# Patient Record
Sex: MALE | Race: WHITE | Employment: FULL TIME | URBAN - METROPOLITAN AREA
[De-identification: names, ages, dates, MRNs, and addresses within clinical notes are randomized per-mention and may not be internally consistent; named-entity substitution may affect disease eponyms.]

---

## 2019-02-23 ENCOUNTER — HOSPITAL ENCOUNTER (EMERGENCY)
Age: 29
Discharge: HOME | End: 2019-02-23
Attending: EMERGENCY MEDICINE
Payer: COMMERCIAL

## 2019-02-23 ENCOUNTER — APPOINTMENT (OUTPATIENT)
Dept: GENERAL RADIOLOGY | Age: 29
End: 2019-02-23
Attending: EMERGENCY MEDICINE
Payer: COMMERCIAL

## 2019-02-23 VITALS
TEMPERATURE: 98.4 F | HEIGHT: 71 IN | BODY MASS INDEX: 29.48 KG/M2 | RESPIRATION RATE: 18 BRPM | WEIGHT: 210.54 LBS | SYSTOLIC BLOOD PRESSURE: 170 MMHG | HEART RATE: 90 BPM | DIASTOLIC BLOOD PRESSURE: 104 MMHG | OXYGEN SATURATION: 100 %

## 2019-02-23 DIAGNOSIS — J98.01 ACUTE BRONCHOSPASM: ICD-10-CM

## 2019-02-23 DIAGNOSIS — J98.2 PNEUMOMEDIASTINUM (HCC): Primary | ICD-10-CM

## 2019-02-23 DIAGNOSIS — Z71.6 ENCOUNTER FOR SMOKING CESSATION COUNSELING: ICD-10-CM

## 2019-02-23 DIAGNOSIS — R09.89 CHOKING EPISODE: ICD-10-CM

## 2019-02-23 PROCEDURE — 74011250637 HC RX REV CODE- 250/637: Performed by: EMERGENCY MEDICINE

## 2019-02-23 PROCEDURE — 74011000250 HC RX REV CODE- 250: Performed by: EMERGENCY MEDICINE

## 2019-02-23 PROCEDURE — 99284 EMERGENCY DEPT VISIT MOD MDM: CPT

## 2019-02-23 PROCEDURE — 71046 X-RAY EXAM CHEST 2 VIEWS: CPT

## 2019-02-23 RX ORDER — FAMOTIDINE 20 MG/1
20 TABLET, FILM COATED ORAL
Status: COMPLETED | OUTPATIENT
Start: 2019-02-23 | End: 2019-02-23

## 2019-02-23 RX ORDER — PREDNISONE 10 MG/1
TABLET ORAL
Qty: 21 TAB | Refills: 0 | Status: ON HOLD | OUTPATIENT
Start: 2019-02-23 | End: 2020-12-15

## 2019-02-23 RX ORDER — ONDANSETRON 4 MG/1
4 TABLET, ORALLY DISINTEGRATING ORAL
Qty: 20 TAB | Refills: 0 | Status: ON HOLD | OUTPATIENT
Start: 2019-02-23 | End: 2020-12-15

## 2019-02-23 RX ORDER — DEXAMETHASONE SODIUM PHOSPHATE 4 MG/ML
10 INJECTION, SOLUTION INTRA-ARTICULAR; INTRALESIONAL; INTRAMUSCULAR; INTRAVENOUS; SOFT TISSUE
Status: COMPLETED | OUTPATIENT
Start: 2019-02-23 | End: 2019-02-23

## 2019-02-23 RX ORDER — BENZONATATE 100 MG/1
100 CAPSULE ORAL
Qty: 30 CAP | Refills: 0 | Status: SHIPPED | OUTPATIENT
Start: 2019-02-23 | End: 2019-02-23

## 2019-02-23 RX ORDER — DEXTROAMPHETAMINE SACCHARATE, AMPHETAMINE ASPARTATE, DEXTROAMPHETAMINE SULFATE AND AMPHETAMINE SULFATE 7.5; 7.5; 7.5; 7.5 MG/1; MG/1; MG/1; MG/1
30 TABLET ORAL DAILY
Status: ON HOLD | COMMUNITY
End: 2020-12-15

## 2019-02-23 RX ORDER — PREDNISONE 10 MG/1
TABLET ORAL
Qty: 21 TAB | Refills: 0 | Status: SHIPPED | OUTPATIENT
Start: 2019-02-23 | End: 2019-02-23

## 2019-02-23 RX ORDER — KETOROLAC TROMETHAMINE 10 MG/1
10 TABLET, FILM COATED ORAL
Qty: 20 TAB | Refills: 0 | Status: SHIPPED | OUTPATIENT
Start: 2019-02-23 | End: 2019-02-23

## 2019-02-23 RX ORDER — KETOROLAC TROMETHAMINE 10 MG/1
10 TABLET, FILM COATED ORAL
Qty: 20 TAB | Refills: 0 | Status: ON HOLD | OUTPATIENT
Start: 2019-02-23 | End: 2020-12-15

## 2019-02-23 RX ORDER — BENZONATATE 100 MG/1
100 CAPSULE ORAL
Qty: 30 CAP | Refills: 0 | Status: SHIPPED | OUTPATIENT
Start: 2019-02-23 | End: 2019-03-02

## 2019-02-23 RX ORDER — ONDANSETRON 4 MG/1
4 TABLET, ORALLY DISINTEGRATING ORAL
Qty: 20 TAB | Refills: 0 | Status: SHIPPED | OUTPATIENT
Start: 2019-02-23 | End: 2019-02-23

## 2019-02-23 RX ADMIN — DEXAMETHASONE SODIUM PHOSPHATE 10 MG: 4 INJECTION, SOLUTION INTRA-ARTICULAR; INTRALESIONAL; INTRAMUSCULAR; INTRAVENOUS; SOFT TISSUE at 03:05

## 2019-02-23 RX ADMIN — LIDOCAINE HYDROCHLORIDE 40 ML: 20 SOLUTION ORAL; TOPICAL at 03:06

## 2019-02-23 RX ADMIN — FAMOTIDINE 20 MG: 20 TABLET ORAL at 03:05

## 2019-02-23 NOTE — ED PROVIDER NOTES
EMERGENCY DEPARTMENT HISTORY AND PHYSICAL EXAM 
 
 
Date: 2/23/2019 Patient Name: Ghada Blevins History of Presenting Illness Chief Complaint Patient presents with  Hemoptysis  
  ambulatory to triage c/o eating pot roast at 2330 & feels like its stuck in throat. Denies gi MD or meds pta. Speaking in full sentences in triage. Nothing observed in throat. Reports coughing up blood pta. History Provided By: Patient HPI: Ghada Blevins, 33 y.o. male with no significant PMHx significant, presents ambulatory to the ED with cc of acute onset tightness in throat aftter eating pot roast around 2330 last night. Pt reports that his voice is a lot higher than usual. He also adds that he coughed up some bright red blood. Additionally, pt specifically denies any recent fever, chills, headache, nausea, vomiting, diarrhea, abdominal pain, CP, lightheadedness, dizziness, numbness, weakness, tingling, BLE swelling, heart palpitations, urinary sxs, changes in BM, melena, hematochezia, or congestion. PCP: None PMHx: Significant for none PSHx: Significant for none Social Hx: E-cigarette, -EtOH, -Illicit Drugs There are no other complaints, changes or physical findings at this time. Past History Past Medical History: 
History reviewed. No pertinent past medical history. Past Surgical History: 
History reviewed. No pertinent surgical history. Family History: 
History reviewed. No pertinent family history. Social History: 
Social History Tobacco Use  Smoking status: Current Every Day Smoker  Smokeless tobacco: Never Used  Tobacco comment: E-cig Substance Use Topics  Alcohol use: Not on file  Drug use: No  
 
 
Allergies: 
No Known Allergies Medications: No current facility-administered medications on file prior to encounter. Current Outpatient Medications on File Prior to Encounter Medication Sig Dispense Refill  dextroamphetamine-amphetamine (ADDERALL) 30 mg tablet Take 30 mg by mouth daily. Review of Systems Review of Systems Constitutional: Negative. Negative for chills and fever. HENT: Positive for sore throat and trouble swallowing. Negative for congestion, facial swelling, rhinorrhea and voice change. Eyes: Negative. Respiratory: Positive for cough (blood) and shortness of breath. Negative for apnea, chest tightness and wheezing. Cardiovascular: Negative. Negative for chest pain, palpitations and leg swelling. Gastrointestinal: Negative. Negative for abdominal distention, abdominal pain, blood in stool, constipation, diarrhea, nausea and vomiting. Endocrine: Negative. Negative for cold intolerance, heat intolerance and polyuria. Genitourinary: Negative. Negative for difficulty urinating, dysuria, flank pain, frequency, hematuria and urgency. Musculoskeletal: Negative. Negative for arthralgias, back pain, myalgias, neck pain and neck stiffness. Skin: Negative. Negative for color change and rash. Neurological: Negative. Negative for dizziness, syncope, facial asymmetry, speech difficulty, weakness, light-headedness, numbness and headaches. Hematological: Negative. Does not bruise/bleed easily. Psychiatric/Behavioral: Negative. Negative for confusion and self-injury. The patient is not nervous/anxious. Physical Exam  
Physical Exam  
Constitutional: He is oriented to person, place, and time. Vital signs are normal. He appears well-developed and well-nourished. He is cooperative. Non-toxic appearance. HENT:  
Head: Normocephalic and atraumatic. Mouth/Throat: Mucous membranes are normal. No posterior oropharyngeal erythema. Eyes: Conjunctivae and EOM are normal. Pupils are equal, round, and reactive to light. Neck: Normal range of motion.   
Cardiovascular: Normal rate, regular rhythm, normal heart sounds and intact distal pulses. Exam reveals no gallop and no friction rub. No murmur heard. Pulmonary/Chest: Effort normal and breath sounds normal. No respiratory distress. He has no wheezes. He has no rales. He exhibits no tenderness. Abdominal: Soft. Bowel sounds are normal. He exhibits no distension and no mass. There is no tenderness. There is no rebound and no guarding. Musculoskeletal: Normal range of motion. He exhibits no edema, tenderness or deformity. Neurological: He is alert and oriented to person, place, and time. He displays normal reflexes. No cranial nerve deficit. He exhibits normal muscle tone. Coordination normal.  
Skin: Skin is warm. No rash noted. Psychiatric: He has a normal mood and affect. Nursing note and vitals reviewed. Diagnostic Study Results Radiologic Studies -  
XR CHEST PA LAT Final Result IMPRESSION: Pneumomediastinum extending into the neck. No pneumothorax or  
pleural effusion is evident. CXR Results  (Last 48 hours) 02/23/19 0230  XR CHEST PA LAT Final result Impression:  IMPRESSION: Pneumomediastinum extending into the neck. No pneumothorax or  
pleural effusion is evident. Narrative:  INDICATION: Hemoptysis FINDINGS: PA and lateral views of the chest demonstrate a normal heart size. There is evidence of pneumomediastinum extending into the neck. The lungs are  
clear. No pneumothorax or pleural effusion is evident. The visualized osseous  
structures are unremarkable. Medical Decision Making I am the first provider for this patient. I reviewed the vital signs, available nursing notes, past medical history, past surgical history, family history and social history. Vital Signs-Reviewed the patient's vital signs. Patient Vitals for the past 24 hrs: 
 Temp Pulse Resp BP SpO2  
02/23/19 0150     100 % 02/23/19 0137 98.4 °F (36.9 °C) 90 18 (!) 170/104 100 % Pulse Oximetry Analysis - 100% on RA Cardiac Monitor:  
Rate: 90 bpm 
Rhythm: Normal Sinus Rhythm Records Reviewed: Nursing Notes, Old Medical Records, Previous electrocardiograms, Previous Radiology Studies and Previous Laboratory Studies Provider Notes (Medical Decision Making):  
Patient presents with acute dyspnea likely bronchospasm from coughing episode. DDx: asthma, copd, pna, pulmonary edema, acute bronchitis, ptx, pna. Will obtain CXR, provide O2 as needed for hypoxia, treat symptomatically and reassess. Will continue to monitor closely in ED. ED Course:  
Initial assessment performed. The patients presenting problems have been discussed, and they are in agreement with the care plan formulated and outlined with them. I have encouraged them to ask questions as they arise throughout their visit. TOBACCO COUNSELING:  
Upon evaluation, pt expressed that they are a current tobacco user. For approximately 10 minutes, pt has been counseled on the dangers of smoking and was encouraged to quit as soon as possible in order to decrease further risks to their health. Pt has conveyed their understanding of the risks involved should they continue to use tobacco products. I reviewed our electronic medical record system for any past medical records that were available that may contribute to the patient's current condition, the nursing notes and vital signs from today's visit. Adry Gerardo MD 
Medications Administered During ED Course: 
Medications  
mylanta/viscous lidocaine (GI COCKTAIL) (40 mL Oral Given 2/23/19 0306) dexamethasone (DECADRON) 4 mg/mL injection 10 mg (10 mg Oral Given 2/23/19 0305) famotidine (PEPCID) tablet 20 mg (20 mg Oral Given 2/23/19 0305) Progress Note: 
2:45 PM 
CXR reviewed; evidence of uncomplicated spontaneous pneumomediastinum; vitals stable; no hypoxia, no resp distress.  Will manage conservatively with analgesia, rest, and avoidance of maneuvers that increase pulmonary pressure (Valsalva). Progress Note: 
2:56 AM 
Patient has been reassessed and reports feeling better and symptoms have improved after ED treatment. Bekah Chen is able to tolerate PO and ambulate per baseline. Jenae Anderson imaging has been reviewed with him. He has been counseled regarding his diagnosis. He verbally conveys understanding and agreement of the signs, symptoms, diagnosis, treatment and prognosis and additionally agrees to follow up as recommended with Dr. None in 24 - 48 hours. He also agrees with the care-plan and conveys that all of his questions have been answered. I have also put together some discharge instructions for him that include: 1) educational information regarding their diagnosis, 2) how to care for their diagnosis at home, as well a 3) list of reasons why they would want to return to the ED prior to their follow-up appointment, should their condition change. I have answered all questions to patient's satisfaction. He both understood and agreed with plan as discussed above. Vital signs stable for discharge. Critical Care Time:  
None Disposition: 
Discharge Note: 
3:20 AM 
The patient has been re-evaluated and is ready for discharge. Reviewed available results with patient. Counseled patient/parent/guardian on diagnosis and care plan. Patient has expressed understanding, and all questions have been answered. Patient agrees with plan and agrees to follow up as recommended, or return to the ED if their symptoms worsen. Discharge instructions have been provided and explained to the patient, along with reasons to return to the ED. PLAN: 
1. Discharge Discharge Medication List as of 2/23/2019  3:26 AM  
  
CONTINUE these medications which have NOT CHANGED Details  
dextroamphetamine-amphetamine (ADDERALL) 30 mg tablet Take 30 mg by mouth daily. , Historical Med  
  
  
 No current facility-administered medications for this encounter. Current Outpatient Medications:  
  dextroamphetamine-amphetamine (ADDERALL) 30 mg tablet, Take 30 mg by mouth daily. , Disp: , Rfl:  
  benzonatate (TESSALON PERLES) 100 mg capsule, Take 1 Cap by mouth three (3) times daily as needed for Cough for up to 7 days. , Disp: 30 Cap, Rfl: 0 
  predniSONE (STERAPRED DS) 10 mg dose pack, Take as prescribed, Disp: 21 Tab, Rfl: 0 
  ondansetron (ZOFRAN ODT) 4 mg disintegrating tablet, Take 1 Tab by mouth every eight (8) hours as needed for Nausea., Disp: 20 Tab, Rfl: 0 
  ketorolac (TORADOL) 10 mg tablet, Take 1 Tab by mouth every six (6) hours as needed for Pain., Disp: 20 Tab, Rfl: 0 
 
2. Follow-up Information Follow up With Specialties Details Why Contact Info None    None (395) Patient stated that they have no PCP [facility] EM[facility] Emergency Medicine  As needed, If symptoms worsen [address] [place] [address] [address] [facility] 
[phone] Return to ED if worse Diagnosis Clinical Impression: 1. Pneumomediastinum ([address] 2. Acute bronchospasm 3. Choking episode 4. Encounter for smoking cessation counseling Attestations This note is prepared by Carole Lo, acting as Scribe for MD Lou Ramires MD : The scribe's documentation has been prepared under my direction and personally reviewed by me in its entirety. I confirm that the note above accurately reflects all work, treatment, procedures, and medical decision making performed by me. 
 
: 
This note will not be viewable in [address] Yahir Rodriguez

## 2019-02-23 NOTE — LETTER
[facility] EMERGENCY DEPT 
[address] [place] [address] [phone] Work/School Note Date: 2/23/2019 To Whom It May concern: 
 
Quinton Medina was seen and treated today in the emergency room by the following provider(s): 
Attending Provider: Fredy Wing MD. Quinton Medina may return to work on 2/25/19. Sincerely, Millie Benz MD

## 2019-02-23 NOTE — DISCHARGE INSTRUCTIONS
Thank you for allowing us to take care of you today! We hope we addressed all of your concerns and needs. We strive to provide excellent quality care in the Emergency Department. You will receive a survey after your visit to evaluate the care you were provided. Should you receive a survey from us, we invite you to share your experience and tell us what made it excellent. It was a pleasure serving you, we invite you to share your experience with us, in our pursuit for excellence, should you be selected to receive a survey. The exam and treatment you received in the Emergency Department were for an urgent problem and are not intended as complete care. It is important that you follow up with a doctor, nurse practitioner, or physician assistant for ongoing care. If your symptoms become worse or you do not improve as expected and you are unable to reach your usual health care provider, you should return to the Emergency Department. We are available 24 hours a day. Please take your discharge instructions with you when you go to your follow-up appointment. If you have any problem arranging a follow-up appointment, contact the Emergency Department immediately. If a prescription has been provided, please have it filled as soon as possible to prevent a delay in treatment. Read the entire medication instruction sheet provided to you by the pharmacy. If you have any questions or reservations about taking the medication due to side effects or interactions with other medications, please call your primary care physician or contact the ER to speak with the charge nurse. Make an appointment with your family doctor or the physician you were referred to for follow-up of this visit as instructed on your discharge paperwork, as this is mandatory follow-up. Return to the ER if you are unable to be seen or if you are unable to be seen in a timely manner.     If you have any problem arranging the follow-up visit, contact the Emergency Department immediately. I hope you feel better and thank you again for allow us to provide you with excellent care today at [facility]! Warmest regards,    Millie Benz MD  Emergency Medicine Physician  [facility]              Patient Education        Pneumomediastinum: Care Instructions  Your Care Instructions  Pneumomediastinum (say \"[id number]\") is the leakage of air into a space between the lungs (mediastinum). As air builds up in this space in your chest, you may have shortness of breath or chest pain that moves to your neck or arm. Most cases get better on their own. But if there are problems, you may need to breathe oxygen through a face mask, or have a tube placed in your chest, to help you heal. It can take several days for the leaked air to be reabsorbed by your body, and you may need more treatment. The doctor has checked you carefully, but problems can develop later. If you notice any problems or new symptoms, get medical treatment right away. Follow-up care is a key part of your treatment and safety. Be sure to make and go to all appointments, and call your doctor if you are having problems. It's also a good idea to know your test results and keep a list of the medicines you take. How can you care for yourself at home? · Get plenty of rest and sleep. You may feel weak and tired for a while, but your energy level will improve with time. · To relieve chest pain, especially when you cough, press a pillow against your chest.  · Take pain medicines exactly as directed:  ? If the doctor gave you a prescription medicine for pain, take it as prescribed. ? If you are not taking a prescription pain medicine, ask your doctor if you can take an over-the-counter pain medicine. · If your doctor prescribed antibiotics, take them as directed.  Do not stop taking them just because you feel better. You need to take the full course of antibiotics. · If you go home with a chest tube in place, follow your doctor's directions. ? Do not adjust the tube in any way. This could break the seal or cause other problems. ? Keep the tube dry. · If you have a bandage over where a chest tube was inserted, keep it clean and dry. Follow your doctor's instructions on bandage care. · Avoid any movements that make you strain your muscles. Try not to cough, laugh hard, or lift anything heavy. · Do not smoke or allow others to smoke around you. If you need help quitting, talk to your doctor about stop-smoking programs and medicines. These can increase your chances of quitting for good. When should you call for help? Call 911 anytime you think your may need emergency care. For example, call if:    · You have severe trouble breathing.     · You passed out (lost consciousness).     · You have severe chest pain.    Call your doctor now or seek immediate medical care if:    · You have new or worse trouble breathing.     · You have new or worse chest pain.     · You have a fever.     · You cough up blood.     · You are bleeding through the bandage where the tube was put in.    Watch closely for changes in your health, and be sure to contact your doctor if:    · You do not get better as expected. Where can you learn more? Go to http://kat-franklin.info/. Enter B290 in the search box to learn more about \"Pneumomediastinum: Care Instructions. \"  Current as of: September 5, 2018  Content Version: 11.9  © 3483-3078 [employer]. Care instructions adapted under license by [employer] (which disclaims liability or warranty for this information). If you have questions about a medical condition or this instruction, always ask your healthcare professional. [address] any warranty or liability for your use of this information.

## 2019-02-23 NOTE — ED NOTES
MD Urban Mina reviewed discharge instructions with the patient. The patient verbalized understanding. All questions and concerns were addressed. The patient declined a wheelchair and is discharged ambulatory  with instructions and prescriptions in hand. Pt is alert and oriented x 4. Respirations are clear and unlabored.

## 2020-12-14 ENCOUNTER — HOSPITAL ENCOUNTER (INPATIENT)
Age: 30
LOS: 3 days | Discharge: HOME | DRG: 885 | End: 2020-12-17
Attending: EMERGENCY MEDICINE | Admitting: PSYCHIATRY & NEUROLOGY
Payer: COMMERCIAL

## 2020-12-14 DIAGNOSIS — F41.1 ANXIETY STATE: Primary | ICD-10-CM

## 2020-12-14 PROBLEM — F31.2 BIPOLAR AFFECTIVE DISORDER, CURRENT EPISODE MANIC WITH PSYCHOTIC SYMPTOMS (HCC): Status: ACTIVE | Noted: 2020-12-14

## 2020-12-14 LAB
ALBUMIN SERPL-MCNC: 4.8 G/DL (ref 3.5–5)
ALBUMIN/GLOB SERPL: 1.4 {RATIO} (ref 1.1–2.2)
ALP SERPL-CCNC: 99 U/L (ref 45–117)
ALT SERPL-CCNC: 39 U/L (ref 12–78)
AMPHET UR QL SCN: NEGATIVE
ANION GAP SERPL CALC-SCNC: 6 MMOL/L (ref 5–15)
APAP SERPL-MCNC: <2 UG/ML (ref 10–30)
APPEARANCE UR: CLEAR
AST SERPL-CCNC: 27 U/L (ref 15–37)
BACTERIA URNS QL MICRO: NEGATIVE /HPF
BARBITURATES UR QL SCN: NEGATIVE
BASOPHILS # BLD: 0.1 K/UL (ref 0–0.1)
BASOPHILS NFR BLD: 1 % (ref 0–1)
BENZODIAZ UR QL: NEGATIVE
BILIRUB SERPL-MCNC: 0.5 MG/DL (ref 0.2–1)
BILIRUB UR QL: NEGATIVE
BUN SERPL-MCNC: 11 MG/DL (ref 6–20)
BUN/CREAT SERPL: 10 (ref 12–20)
CALCIUM SERPL-MCNC: 9.7 MG/DL (ref 8.5–10.1)
CANNABINOIDS UR QL SCN: POSITIVE
CHLORIDE SERPL-SCNC: 107 MMOL/L (ref 97–108)
CO2 SERPL-SCNC: 25 MMOL/L (ref 21–32)
COCAINE UR QL SCN: NEGATIVE
COLOR UR: ABNORMAL
COVID-19 RAPID TEST, COVR: NOT DETECTED
CREAT SERPL-MCNC: 1.11 MG/DL (ref 0.7–1.3)
DIFFERENTIAL METHOD BLD: ABNORMAL
DRUG SCRN COMMENT,DRGCM: ABNORMAL
EOSINOPHIL # BLD: 0 K/UL (ref 0–0.4)
EOSINOPHIL NFR BLD: 0 % (ref 0–7)
EPITH CASTS URNS QL MICRO: ABNORMAL /LPF
ERYTHROCYTE [DISTWIDTH] IN BLOOD BY AUTOMATED COUNT: 13 % (ref 11.5–14.5)
ETHANOL SERPL-MCNC: <10 MG/DL
GLOBULIN SER CALC-MCNC: 3.5 G/DL (ref 2–4)
GLUCOSE SERPL-MCNC: 98 MG/DL (ref 65–100)
GLUCOSE UR STRIP.AUTO-MCNC: NEGATIVE MG/DL
HCT VFR BLD AUTO: 47.9 % (ref 36.6–50.3)
HGB BLD-MCNC: 16.3 G/DL (ref 12.1–17)
HGB UR QL STRIP: ABNORMAL
HYALINE CASTS URNS QL MICRO: ABNORMAL /LPF (ref 0–5)
IMM GRANULOCYTES # BLD AUTO: 0.1 K/UL (ref 0–0.04)
IMM GRANULOCYTES NFR BLD AUTO: 1 % (ref 0–0.5)
KETONES UR QL STRIP.AUTO: ABNORMAL MG/DL
LEUKOCYTE ESTERASE UR QL STRIP.AUTO: NEGATIVE
LYMPHOCYTES # BLD: 1.9 K/UL (ref 0.8–3.5)
LYMPHOCYTES NFR BLD: 20 % (ref 12–49)
MCH RBC QN AUTO: 30.9 PG (ref 26–34)
MCHC RBC AUTO-ENTMCNC: 34 G/DL (ref 30–36.5)
MCV RBC AUTO: 90.9 FL (ref 80–99)
METHADONE UR QL: NEGATIVE
MONOCYTES # BLD: 0.8 K/UL (ref 0–1)
MONOCYTES NFR BLD: 8 % (ref 5–13)
NEUTS SEG # BLD: 7 K/UL (ref 1.8–8)
NEUTS SEG NFR BLD: 70 % (ref 32–75)
NITRITE UR QL STRIP.AUTO: NEGATIVE
NRBC # BLD: 0 K/UL (ref 0–0.01)
NRBC BLD-RTO: 0 PER 100 WBC
OPIATES UR QL: NEGATIVE
PCP UR QL: NEGATIVE
PH UR STRIP: 5.5 [PH] (ref 5–8)
PLATELET # BLD AUTO: 304 K/UL (ref 150–400)
PMV BLD AUTO: 9.2 FL (ref 8.9–12.9)
POTASSIUM SERPL-SCNC: 4 MMOL/L (ref 3.5–5.1)
PROT SERPL-MCNC: 8.3 G/DL (ref 6.4–8.2)
PROT UR STRIP-MCNC: NEGATIVE MG/DL
RBC # BLD AUTO: 5.27 M/UL (ref 4.1–5.7)
RBC #/AREA URNS HPF: ABNORMAL /HPF (ref 0–5)
SALICYLATES SERPL-MCNC: <1.7 MG/DL (ref 2.8–20)
SARS-COV-2, COV2: NOT DETECTED
SODIUM SERPL-SCNC: 138 MMOL/L (ref 136–145)
SOURCE, COVRS: NORMAL
SP GR UR REFRACTOMETRY: 1.02 (ref 1–1.03)
SPECIMEN SOURCE, FCOV2M: NORMAL
SPECIMEN SOURCE, FCOV2M: NORMAL
SPECIMEN TYPE, XMCV1T: NORMAL
UR CULT HOLD, URHOLD: NORMAL
UROBILINOGEN UR QL STRIP.AUTO: 0.2 EU/DL (ref 0.2–1)
WBC # BLD AUTO: 9.9 K/UL (ref 4.1–11.1)
WBC URNS QL MICRO: ABNORMAL /HPF (ref 0–4)

## 2020-12-14 PROCEDURE — 81001 URINALYSIS AUTO W/SCOPE: CPT

## 2020-12-14 PROCEDURE — 65220000003 HC RM SEMIPRIVATE PSYCH

## 2020-12-14 PROCEDURE — 36415 COLL VENOUS BLD VENIPUNCTURE: CPT

## 2020-12-14 PROCEDURE — 87635 SARS-COV-2 COVID-19 AMP PRB: CPT

## 2020-12-14 PROCEDURE — 80307 DRUG TEST PRSMV CHEM ANLYZR: CPT

## 2020-12-14 PROCEDURE — 74011250637 HC RX REV CODE- 250/637: Performed by: NURSE PRACTITIONER

## 2020-12-14 PROCEDURE — 85025 COMPLETE CBC W/AUTO DIFF WBC: CPT

## 2020-12-14 PROCEDURE — 84443 ASSAY THYROID STIM HORMONE: CPT

## 2020-12-14 PROCEDURE — 80053 COMPREHEN METABOLIC PANEL: CPT

## 2020-12-14 PROCEDURE — 74011250637 HC RX REV CODE- 250/637: Performed by: STUDENT IN AN ORGANIZED HEALTH CARE EDUCATION/TRAINING PROGRAM

## 2020-12-14 PROCEDURE — 99284 EMERGENCY DEPT VISIT MOD MDM: CPT

## 2020-12-14 PROCEDURE — 90791 PSYCH DIAGNOSTIC EVALUATION: CPT

## 2020-12-14 RX ORDER — OLANZAPINE 5 MG/1
5 TABLET ORAL
Status: DISCONTINUED | OUTPATIENT
Start: 2020-12-14 | End: 2020-12-16

## 2020-12-14 RX ORDER — CLONIDINE HYDROCHLORIDE 0.1 MG/1
0.2 TABLET ORAL
Status: COMPLETED | OUTPATIENT
Start: 2020-12-14 | End: 2020-12-14

## 2020-12-14 RX ORDER — DIPHENHYDRAMINE HYDROCHLORIDE 50 MG/ML
50 INJECTION, SOLUTION INTRAMUSCULAR; INTRAVENOUS
Status: DISCONTINUED | OUTPATIENT
Start: 2020-12-14 | End: 2020-12-17 | Stop reason: HOSPADM

## 2020-12-14 RX ORDER — ADHESIVE BANDAGE
30 BANDAGE TOPICAL DAILY PRN
Status: DISCONTINUED | OUTPATIENT
Start: 2020-12-14 | End: 2020-12-17 | Stop reason: HOSPADM

## 2020-12-14 RX ORDER — ACETAMINOPHEN 325 MG/1
650 TABLET ORAL
Status: DISCONTINUED | OUTPATIENT
Start: 2020-12-14 | End: 2020-12-17 | Stop reason: HOSPADM

## 2020-12-14 RX ORDER — HALOPERIDOL 5 MG/ML
5 INJECTION INTRAMUSCULAR
Status: DISCONTINUED | OUTPATIENT
Start: 2020-12-14 | End: 2020-12-17 | Stop reason: HOSPADM

## 2020-12-14 RX ORDER — LORAZEPAM 2 MG/ML
1 INJECTION INTRAMUSCULAR
Status: DISCONTINUED | OUTPATIENT
Start: 2020-12-14 | End: 2020-12-17 | Stop reason: HOSPADM

## 2020-12-14 RX ORDER — TRAZODONE HYDROCHLORIDE 50 MG/1
50 TABLET ORAL
Status: DISCONTINUED | OUTPATIENT
Start: 2020-12-14 | End: 2020-12-17 | Stop reason: HOSPADM

## 2020-12-14 RX ORDER — BENZTROPINE MESYLATE 1 MG/1
1 TABLET ORAL
Status: DISCONTINUED | OUTPATIENT
Start: 2020-12-14 | End: 2020-12-17 | Stop reason: HOSPADM

## 2020-12-14 RX ORDER — HYDROXYZINE 50 MG/1
50 TABLET, FILM COATED ORAL
Status: DISCONTINUED | OUTPATIENT
Start: 2020-12-14 | End: 2020-12-17 | Stop reason: HOSPADM

## 2020-12-14 RX ADMIN — CLONIDINE HYDROCHLORIDE 0.2 MG: 0.1 TABLET ORAL at 19:19

## 2020-12-14 RX ADMIN — TRAZODONE HYDROCHLORIDE 50 MG: 50 TABLET ORAL at 23:48

## 2020-12-14 RX ADMIN — HYDROXYZINE HYDROCHLORIDE 50 MG: 50 TABLET, FILM COATED ORAL at 23:48

## 2020-12-14 NOTE — ED TRIAGE NOTES
Triage:  Pt to the ED via family due to mental health concerns. Pt sees a therapist and was referred to the ED for a possible \"manic episode\". Pt states in triage he feels great.

## 2020-12-14 NOTE — ED PROVIDER NOTES
The history is provided by the patient and a friend. Mental Health Problem    This is a recurrent problem. Mental status baseline is normal.  Risk factors include the patient not taking meds correctly. The patient is here with his mother-in-law and his pregnant wife is waiting in the car. Patient states that he would like to have his wife evaluated and that he is fine. The patient's mother-in-law states that the patient has been having an escalation in his anxiety and anne. The patient sees a therapist and it was the therapist recommendation that he be evaluated. Currently patient is agreeable to speaking with a mental health professional regarding his condition. Past Medical History:   Diagnosis Date    Mental health disorder        No past surgical history on file. No family history on file.     Social History     Socioeconomic History    Marital status: Not on file     Spouse name: Not on file    Number of children: Not on file    Years of education: Not on file    Highest education level: Not on file   Occupational History    Not on file   Social Needs    Financial resource strain: Not on file    Food insecurity     Worry: Not on file     Inability: Not on file    Transportation needs     Medical: Not on file     Non-medical: Not on file   Tobacco Use    Smoking status: Current Every Day Smoker    Smokeless tobacco: Never Used    Tobacco comment: E-cig   Substance and Sexual Activity    Alcohol use: Not on file    Drug use: No    Sexual activity: Not on file   Lifestyle    Physical activity     Days per week: Not on file     Minutes per session: Not on file    Stress: Not on file   Relationships    Social connections     Talks on phone: Not on file     Gets together: Not on file     Attends Anabaptism service: Not on file     Active member of club or organization: Not on file     Attends meetings of clubs or organizations: Not on file     Relationship status: Not on file   Eliazar Rojo Intimate partner violence     Fear of current or ex partner: Not on file     Emotionally abused: Not on file     Physically abused: Not on file     Forced sexual activity: Not on file   Other Topics Concern    Not on file   Social History Narrative    Not on file         ALLERGIES: Patient has no known allergies. Review of Systems   Constitutional: Negative. HENT: Negative. Eyes: Negative. Respiratory: Negative. Cardiovascular: Negative. Gastrointestinal: Negative. Musculoskeletal: Negative. All other systems reviewed and are negative. Vitals:    12/14/20 1214   BP: (!) 194/95   Pulse: (!) 109   Resp: 18   Temp: 97.8 °F (36.6 °C)   SpO2: 98%   Weight: 79.4 kg (175 lb)   Height: 5' 11\" (1.803 m)            Physical Exam  Vitals signs and nursing note reviewed. Constitutional:       Appearance: Normal appearance. HENT:      Head: Normocephalic and atraumatic. Right Ear: External ear normal.      Left Ear: External ear normal.      Nose: Nose normal.   Eyes:      Extraocular Movements: Extraocular movements intact. Pupils: Pupils are equal, round, and reactive to light. Neck:      Musculoskeletal: Normal range of motion and neck supple. Cardiovascular:      Rate and Rhythm: Normal rate and regular rhythm. Pulses: Normal pulses. Heart sounds: Normal heart sounds. Pulmonary:      Effort: Pulmonary effort is normal.      Breath sounds: Normal breath sounds. Abdominal:      General: Bowel sounds are normal.      Palpations: Abdomen is soft. Musculoskeletal: Normal range of motion. Skin:     General: Skin is warm. Capillary Refill: Capillary refill takes less than 2 seconds. Neurological:      General: No focal deficit present. Mental Status: He is alert and oriented to person, place, and time.    Psychiatric:         Attention and Perception: Attention and perception normal.         Mood and Affect: Mood and affect normal.         Speech: Speech normal.         Behavior: Behavior is cooperative. Cognition and Memory: Memory normal.          MDM  Number of Diagnoses or Management Options  Anxiety state: new and requires workup     Amount and/or Complexity of Data Reviewed  Clinical lab tests: reviewed    Risk of Complications, Morbidity, and/or Mortality  Presenting problems: moderate  Diagnostic procedures: moderate  Management options: moderate    Patient Progress  Patient progress: stable         LABORATORY TESTS:  Recent Results (from the past 12 hour(s))   CBC WITH AUTOMATED DIFF    Collection Time: 12/14/20  1:19 PM   Result Value Ref Range    WBC 9.9 4.1 - 11.1 K/uL    RBC 5.27 4. 10 - 5.70 M/uL    HGB 16.3 12.1 - 17.0 g/dL    HCT 47.9 36.6 - 50.3 %    MCV 90.9 80.0 - 99.0 FL    MCH 30.9 26.0 - 34.0 PG    MCHC 34.0 30.0 - 36.5 g/dL    RDW 13.0 11.5 - 14.5 %    PLATELET 881 860 - 108 K/uL    MPV 9.2 8.9 - 12.9 FL    NRBC 0.0 0  WBC    ABSOLUTE NRBC 0.00 0.00 - 0.01 K/uL    NEUTROPHILS 70 32 - 75 %    LYMPHOCYTES 20 12 - 49 %    MONOCYTES 8 5 - 13 %    EOSINOPHILS 0 0 - 7 %    BASOPHILS 1 0 - 1 %    IMMATURE GRANULOCYTES 1 (H) 0.0 - 0.5 %    ABS. NEUTROPHILS 7.0 1.8 - 8.0 K/UL    ABS. LYMPHOCYTES 1.9 0.8 - 3.5 K/UL    ABS. MONOCYTES 0.8 0.0 - 1.0 K/UL    ABS. EOSINOPHILS 0.0 0.0 - 0.4 K/UL    ABS. BASOPHILS 0.1 0.0 - 0.1 K/UL    ABS. IMM.  GRANS. 0.1 (H) 0.00 - 0.04 K/UL    DF AUTOMATED     METABOLIC PANEL, COMPREHENSIVE    Collection Time: 12/14/20  1:19 PM   Result Value Ref Range    Sodium 138 136 - 145 mmol/L    Potassium 4.0 3.5 - 5.1 mmol/L    Chloride 107 97 - 108 mmol/L    CO2 25 21 - 32 mmol/L    Anion gap 6 5 - 15 mmol/L    Glucose 98 65 - 100 mg/dL    BUN 11 6 - 20 MG/DL    Creatinine 1.11 0.70 - 1.30 MG/DL    BUN/Creatinine ratio 10 (L) 12 - 20      GFR est AA >60 >60 ml/min/1.73m2    GFR est non-AA >60 >60 ml/min/1.73m2    Calcium 9.7 8.5 - 10.1 MG/DL    Bilirubin, total 0.5 0.2 - 1.0 MG/DL    ALT (SGPT) 39 12 - 78 U/L    AST (SGOT) 27 15 - 37 U/L    Alk. phosphatase 99 45 - 117 U/L    Protein, total 8.3 (H) 6.4 - 8.2 g/dL    Albumin 4.8 3.5 - 5.0 g/dL    Globulin 3.5 2.0 - 4.0 g/dL    A-G Ratio 1.4 1.1 - 2.2     ETHYL ALCOHOL    Collection Time: 12/14/20  1:19 PM   Result Value Ref Range    ALCOHOL(ETHYL),SERUM <10 <10 MG/DL   ACETAMINOPHEN    Collection Time: 12/14/20  1:19 PM   Result Value Ref Range    Acetaminophen level <2 (L) 10 - 30 ug/mL   SALICYLATE    Collection Time: 12/14/20  1:19 PM   Result Value Ref Range    Salicylate level <2.6 (L) 2.8 - 20.0 MG/DL   URINALYSIS W/ RFLX MICROSCOPIC    Collection Time: 12/14/20  1:19 PM   Result Value Ref Range    Color YELLOW/STRAW      Appearance CLEAR CLEAR      Specific gravity 1.024 1.003 - 1.030      pH (UA) 5.5 5.0 - 8.0      Protein Negative NEG mg/dL    Glucose Negative NEG mg/dL    Ketone TRACE (A) NEG mg/dL    Bilirubin Negative NEG      Blood TRACE (A) NEG      Urobilinogen 0.2 0.2 - 1.0 EU/dL    Nitrites Negative NEG      Leukocyte Esterase Negative NEG      WBC 0-4 0 - 4 /hpf    RBC 0-5 0 - 5 /hpf    Epithelial cells FEW FEW /lpf    Bacteria Negative NEG /hpf    Hyaline cast 0-2 0 - 5 /lpf   DRUG SCREEN, URINE    Collection Time: 12/14/20  1:19 PM   Result Value Ref Range    AMPHETAMINES Negative NEG      BARBITURATES Negative NEG      BENZODIAZEPINES Negative NEG      COCAINE Negative NEG      METHADONE Negative NEG      OPIATES Negative NEG      PCP(PHENCYCLIDINE) Negative NEG      THC (TH-CANNABINOL) Positive (A) NEG      Drug screen comment (NOTE)    URINE CULTURE HOLD SAMPLE    Collection Time: 12/14/20  1:19 PM    Specimen: Serum   Result Value Ref Range    Urine culture hold        Urine on hold in Microbiology dept for 2 days. If unpreserved urine is submitted, it cannot be used for addtional testing after 24 hours, recollection will be required. IMAGING RESULTS:    MEDICATIONS GIVEN:  Medications - No data to display    IMPRESSION:  1.  Anxiety state PLAN:  1. Patient Medically Cleared by ME but will need COVID Test if admitted. . Awaiting BSMART Consult and recommendations. Patient in [address] to be seen. 2. 2:54 PM  Change of shift. Care of patient signed over to Dr. Vigensh Gaytan. Bedside handoff complete. Awaiting BSMART Evaluation and final dispo. If patient is admitted, RAPID Covid-19 will need to be obtained.

## 2020-12-14 NOTE — ED NOTES
Pt becoming increasingly agitated and slammed door closed. Went to address patient and he stated he doesn't understand what is taking so long. Pt is requesting to just leave and for us to speak with his counselor. Informed pt that he is awaiting BSMART evaluation and she is currently unavailable now but will be coming. Pt still upset. \"no one is telling me anything. \"  Reiterated the need for pt to stay for eval.  Offered water. Told pt door may remain shut if his family member was visible from nurses station along with pt. Pt and family member agreed.
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
## 2020-12-15 LAB — TSH SERPL DL<=0.05 MIU/L-ACNC: 1.77 UIU/ML (ref 0.36–3.74)

## 2020-12-15 PROCEDURE — 74011250636 HC RX REV CODE- 250/636: Performed by: NURSE PRACTITIONER

## 2020-12-15 PROCEDURE — 65220000001 HC RM PRIVATE PSYCH

## 2020-12-15 PROCEDURE — 74011250637 HC RX REV CODE- 250/637: Performed by: NURSE PRACTITIONER

## 2020-12-15 RX ORDER — LORATADINE 10 MG/1
10 TABLET ORAL DAILY
Status: ON HOLD | COMMUNITY
End: 2020-12-15

## 2020-12-15 RX ORDER — LORATADINE 10 MG/1
10 TABLET ORAL DAILY
COMMUNITY
End: 2021-04-02 | Stop reason: ALTCHOICE

## 2020-12-15 RX ADMIN — HALOPERIDOL LACTATE 5 MG: 5 INJECTION, SOLUTION INTRAMUSCULAR at 05:15

## 2020-12-15 RX ADMIN — OLANZAPINE 5 MG: 5 TABLET, FILM COATED ORAL at 22:27

## 2020-12-15 RX ADMIN — TRAZODONE HYDROCHLORIDE 50 MG: 50 TABLET ORAL at 20:35

## 2020-12-15 RX ADMIN — DIPHENHYDRAMINE HYDROCHLORIDE 50 MG: 50 INJECTION, SOLUTION INTRAMUSCULAR; INTRAVENOUS at 05:17

## 2020-12-15 RX ADMIN — LORAZEPAM 1 MG: 2 INJECTION INTRAMUSCULAR; INTRAVENOUS at 05:15

## 2020-12-15 RX ADMIN — HYDROXYZINE HYDROCHLORIDE 50 MG: 50 TABLET, FILM COATED ORAL at 22:27

## 2020-12-15 NOTE — BH NOTES
PRN Medication Documentation    Specific patient behavior that led to need for PRN medication: Pt pacing halls, agitated, yelling, cursing out staff, demanding to leave, labile, grossly psychotic, banging on furniture, pushing on doors to elope, threatening to [facility], and refusing staff multiple redirection. Staff intervention attempted prior to administration: Verbal re-direction. De-escalation, therapeutic listening, and informing. PRN Medication given: IM Ativan 1 mg, benadryl 50 mg, and haldol 5 mg. Patient response/effectiveness to PRN: Pt sitting in dining room. Continuing to monitor q15 min.

## 2020-12-15 NOTE — PROGRESS NOTES
Problem: Discharge Planning  Goal: *Discharge to safe environment  Outcome: Progressing Towards Goal  Note: Pt has a safe home to return to and supportive family in and outside of the home. Goal: *Knowledge of discharge instructions  Outcome: Progressing Towards Goal  Note: Pt verbalized understanding of goals for tx and discharge. Problem: Discharge Planning  Goal: *Knowledge of medication management  Outcome: Not Progressing Towards Goal  Note: Patient refuses to take medication.

## 2020-12-15 NOTE — BH NOTES
PSYCHOSOCIAL ASSESSMENT  :Patient identifying info:  Cayetano Dance is a 31 y.o., male admitted 12/14/2020 12:14 PM     Presenting problem and precipitating factors: Patient admitted into the ED with his family. Per triage, it was because patient is going through a \"manic episode,\" and therapist recommended he admit himself to the hospital. Patient has a history of PTSD from childhood and the [employer]. Per wife, he has been \"hostile\" towards her and delusional that he is God. He had guns in the home, and he removed them from the home this past weekend. Wife shared in [place] [facility] note that pt has had SI in the past, he has not been sleeping well, and has had elevated behavior at work. Family attempted to place pt at [facility] but he was too acute for their program.      Mental status assessment: Alert and oriented x4. Patient slept well. Calm and cooperative in treatment team. Pt reported he is against SSRIs. Pt denied being diagnosed with Bipolar Disorder. Strengths: Pt has a supportive family, a job/stable income, and home to return to. Collateral information: Fariha Burgos [phone]([id number]); Simone Gallego [phone]([id number]) YULISA signed for wife only on 12/15/20     Current psychiatric /substance abuse providers and contact info: Griselda Athens    Previous psychiatric/substance abuse providers and response to treatment: None noted     Family history of mental illness or substance abuse: None noted     Substance abuse history:  LSD  Social History     Tobacco Use    Smoking status: Current Every Day Smoker    Smokeless tobacco: Never Used    Tobacco comment: E-cig   Substance Use Topics    Alcohol use: Not on file       History of biomedical complications associated with substance abuse: None noted     Patient's current acceptance of treatment or motivation for change: Pt admitted under a TDO.      Family constellation: Pt is  for 6 years, and wife is ~4 months pregnant. Is significant other involved? Yes      Describe support system: Mother-in-law and wife. Describe living arrangements and home environment: Pt lives with wife in a home.      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

## 2020-12-15 NOTE — BH NOTES
[facility] 60  Master Treatment Plan for Daja Phillips    Date Treatment Plan Initiated: 12/15/2020    Treatment Plan Modalities:  Type of Modality Amount  (x minutes) Frequency (x/week) Duration (x days) Name of Responsible Staff   710 [facility] meetings to encourage peer interactions 15 7 1 JACEK Cabral     Group psychotherapy to assist in building coping skills and internal controls 60 7 1 Cesilia An   Therapeutic activity groups to build coping skills 60 7 1 Cesilia An   Psychoeducation in group setting to address:   Medication education   Piper Brush 41. PharmD   Coping skills   30 3 1 Cesilia An   Relaxation techniques      [address], RN   Discharge planning   60 2 255 [facility]    60 2 1 Chaplain PALACIOS   60 1 1 volunteer   Recovery/AA/NA      volunteer   Physician medication management   15 7 1 Dr. Muriel Eastman   15 2 1 Doretha Harp and Claire Wrights Andria

## 2020-12-15 NOTE — ROUTINE PROCESS
TRANSFER - OUT REPORT: 
 
Verbal report given to Sadie Farfan RN on Caitlin Amador  being transferred to (unit) for routine progression of care Report consisted of patients Situation, Background, Assessment and  
Recommendations(SBAR). Information from the following report(s) SBAR, ED Summary, Procedure Summary and Accordion was reviewed with the receiving nurse. Lines:    
 
Opportunity for questions and clarification was provided. Patient transported with: 
 Registered Nurse
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
## 2020-12-15 NOTE — BH NOTES
CM NOTE: Sera Hicks spoke to wife 12/15/20 and reported that patient was in this current situation in 2012 and was dx with Bipolar Disorder. He was prescribed Risperdal and per wife, patient did much better and was \"back to normal\" after a weeks. Wife wants patient to be on medications. Will continue to update wife on patient.      Cesilia An, Supervisee in Social Work

## 2020-12-15 NOTE — BH NOTES
PRN Medication Documentation  0338  Specific patient behavior that led to need for PRN medication: Anxiety and sleeplessness  Staff interventions attempted prior to PRN being given: education  PRN medication given: Trazodone and Atarax  Patient response/effectiveness of PRN medication: effective

## 2020-12-15 NOTE — PROGRESS NOTES
Spiritual Care Assessment/Progress Note  [facility]      NAME: Liliya Mcmillan      MRN: [id number]  AGE: 31 y.o.  SEX: male  Adventism Affiliation: No Synagogue   Language: English     12/14/2020     Total Time (in minutes): 20     Spiritual Assessment begun in [address] through conversation with:         []Patient        [] Family    [x] Friend(s)        Reason for Consult: Emergency Department visit, Request by patient     Spiritual beliefs: (Please include comment if needed)     [] Identifies with a scott tradition:         [] Supported by a scott community:            [] Claims no spiritual orientation:           [] Seeking spiritual identity:                [x] Adheres to an individual form of spirituality:           [] Not able to assess:                           Identified resources for coping:      [] Prayer                               [] Music                  [] Guided Imagery     [] Family/friends                 [] Pet visits     [] Devotional reading                         [x] Unknown     [] Other:                                              Interventions offered during this visit: (See comments for more details)    Patient Interventions: Initial visit, Other (comment), Affirmation of emotions/emotional suffering           Plan of Care:     [] Support spiritual and/or cultural needs    [] Support AMD and/or advance care planning process      [] Support grieving process   [] Coordinate Rites and/or Rituals    [] Coordination with community clergy   [] No spiritual needs identified at this time   [x] Detailed Plan of Care below (See Comments)  [] Make referral to Music Therapy  [] Make referral to Pet Therapy     [] Make referral to Addiction services  [] Make referral to [facility]  [] Make referral to Spiritual Care Partner  [] No future visits requested        [] Follow up upon further referrals     Comments: Responded to pt's request for a ; when arrived, pt was acting aggressively (verbally and gestures) toward the nurse and  on duty was instructing pt to calm down; when given space to be alone with pt in the room, pt spoke in a loud voice detailing things about God and his search for truth in ways that did not make sense; pt did not belong to any scott tradition; pt stated his frustrations about being held involuntarily in the hospital and wanted my assistance in being released; informed pt that release from the hospital had to be based on the medical team's evaluation; ended visit when nurse and  entered room to instruct pt about safety protocols; it seemed pt needs mental health care for his safety. .  Chaka Welsh, Ph.D., M.Div., M.A.,   /Director of [id number] [place] [address]  Paging Service: [phone])

## 2020-12-15 NOTE — ROUTINE PROCESS
TRANSFER - IN REPORT: 
 
Verbal report received from [facility] (name) on Jc Bey  being received from ED (unit) for routine progression of care Report consisted of patients Situation, Background, Assessment and  
Recommendations(SBAR). Information from the following report(s) SBAR was reviewed with the receiving nurse. Opportunity for questions and clarification was provided. Assessment completed upon patients arrival to unit and care assumed.

## 2020-12-15 NOTE — ED NOTES
Client NAD. AXOX4. Informed of pending move to [facility] unit. Paper work arrived with officer. Client calm and cooperative. Breathing even unlabored.

## 2020-12-15 NOTE — BH NOTES
Admission Note:    Patient admitted to [facility] acute unit    Admission Status: TDO    Reason for Admission: Bipolar disorder. Manic episode. Recommended by therapist. Agitated. Brought in by mother in law to [facility] ED. UDS +MJ BAL Neg    Pt's Condition on Arrival: Pt agitated, tearful, anxious, and irritable. He has racing thoughts and rapid speech. He is blaming others (stating it's all his mother's fault). He has poor concentration at the moment. He was given PRN atarax 50 mg PO and Trazodone 50 mg to help him relax and promote sleep. Reports stress since finding out his wife was pregnant. Denies SI/HI. Denies AVH. Pt's Statements/Questions/Concerns: Pt has hx of PTSD from childhood and  experience. Primary Nurse Kassy Clarke and Darlene Meyers RN performed a dual skin assessment on this patient No impairment noted  Marvin score is 23    Belongings searched by Asha secured properly. See orange slip in paper chart and flowsheet in EMR.

## 2020-12-15 NOTE — H&P
[facility] HISTORY AND PHYSICAL    Name:  Ankush Royal  MR#:  [id number]  :  1990  ACCOUNT #:  [de-identified]  ADMIT DATE:  2020    INITIAL PSYCHIATRIC INTERVIEW    CHIEF COMPLAINT:  Theador Darting was a big misunderstanding. \"    HISTORY OF PRESENT ILLNESS:  The patient is a 59-year-old  man who is currently admitted after he was brought in an [facility] to the ER along with his wife and mother-in-law. The family had reported that the patient had been very different in his behavior recently and seemed to be having a manic episode. He had reportedly consumed a large quantity of LSD and had been sleeping poorly. They had reported that he was easy to anger, was talking excessively and expressing some grandiose delusions. After his arrival on the unit, the patient got agitated and had to be given IM Ativan and haloperidol following which he settled down and was calmer and was able to sleep most of the night. During the interview, he denies most of the symptoms reported by his family and says that his mother-in-law hates him and that is why she wanted him in the hospital.  He denies any of the symptoms reported. States that he used LSD about 2 weeks ago and periodically uses small quantities. His urine drug screen is positive for marijuana and says that he does not remember when he last used it. Denies use of alcohol or other substances. States that he wants to leave the hospital and his TDO hearing is scheduled most likely for tomorrow. Denies any psychotic symptoms at the present time. PAST MEDICAL HISTORY:  Reviewed as per the history and physical exam.      Past Medical History:   Diagnosis Date    Mental health disorder      Prior to Admission medications    Medication Sig Start Date End Date Taking? Authorizing Provider   loratadine (CLARITIN) 10 mg tablet Take 10 mg by mouth daily.    Yes Provider, Historical     Vitals:    12/15/20 1619 12/15/20 1922 12/15/20 2239 12/16/20 0740   BP: 123/85 (!) 127/92  (!) 154/84   Pulse: 82 73  84   Resp: 16 18  16   Temp: 97.8 °F (36.6 °C) 97.4 °F (36.3 °C) 97.6 °F (36.4 °C) 97.6 °F (36.4 °C)   SpO2: 98% 97%  96%   Weight:       Height:         Lab Results   Component Value Date/Time    WBC 9.9 12/14/2020 01:19 PM    HGB 16.3 12/14/2020 01:19 PM    HCT 47.9 12/14/2020 01:19 PM    PLATELET 976 22/58/1815 01:19 PM    MCV 90.9 12/14/2020 01:19 PM     Lab Results   Component Value Date/Time    Sodium 138 12/14/2020 01:19 PM    Potassium 4.0 12/14/2020 01:19 PM    Chloride 107 12/14/2020 01:19 PM    CO2 25 12/14/2020 01:19 PM    Anion gap 6 12/14/2020 01:19 PM    Glucose 98 12/14/2020 01:19 PM    BUN 11 12/14/2020 01:19 PM    Creatinine 1.11 12/14/2020 01:19 PM    BUN/Creatinine ratio 10 (L) 12/14/2020 01:19 PM    GFR est AA >60 12/14/2020 01:19 PM    GFR est non-AA >60 12/14/2020 01:19 PM    Calcium 9.7 12/14/2020 01:19 PM    Bilirubin, total 0.5 12/14/2020 01:19 PM    Alk. phosphatase 99 12/14/2020 01:19 PM    Protein, total 8.3 (H) 12/14/2020 01:19 PM    Albumin 4.8 12/14/2020 01:19 PM    Globulin 3.5 12/14/2020 01:19 PM    A-G Ratio 1.4 12/14/2020 01:19 PM    ALT (SGPT) 39 12/14/2020 01:19 PM    AST (SGOT) 27 12/14/2020 01:19 PM     No results found for: VALF2, VALAC, VALP, VALPR, DS6, CRBAM, CRBAMP, CARB2, XCRBAM  No results found for: LITHM  RADIOLOGY REPORTS:(reviewed/updated 12/16/2020)  No results found. PAST PSYCHIATRIC HISTORY:  The patient reports that he has never been in prior psychiatric treatment, but his mother-in-law had reported that he had been on Adderall about a year ago. There is also a suggestion that he was diagnosed with PTSD while in the [facility] but he denies this. Denies any prior history of suicide attempts. PSYCHOSOCIAL HISTORY:  The patient currently lives in [place] with his wife of 6 years. She is currently pregnant at about 4 months.   He is a  of the [employer] and served in a [employer]. 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 States that he was in combat but has never sought treatment at the [facility] or tried to get a diagnosis for his symptoms. Denies any major legal or financial stressors. Currently, he works as a  for [employer] and says he enjoys his job. MENTAL STATUS EXAMINATION:  The patient is a young  man who is dressed in casual street clothes. He is calm and cooperative during the interview, although he got mildly agitated while talking about his mother-in-law. His speech is spontaneous and increased in output. Psychomotor activity is within normal limits. Mood is reported as being upset and affect is mildly irritable. Denies any active suicidal ideation or plan. Denies any perceptual abnormalities. Denies any delusions. His thought process is logical and goal directed. Cognitively, he is awake and alert, oriented to time, place, and person. Intelligence is average. Memory is intact and fund of knowledge is adequate. Insight is poor. Judgment is poor. 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 I will continue his inpatient stay. We will attempt to get corroborative information from his family. He will be provided with support and attend groups. Estimated length of stay is 5-7 days. His strengths include his ability to seek help and support from his family.         MD SAURABH [email or web address]  D:  12/15/2020 12:21  T:  12/15/2020 14:47  JOB #:  [id number]

## 2020-12-15 NOTE — INTERDISCIPLINARY ROUNDS
Behavioral Health Interdisciplinary Rounds Patient Name: Gurpreet Mathew  Age: 31 y.o. Room/Bed:  734/ Primary Diagnosis: <principal problem not specified> Admission Status: TDO Readmission within 30 days: no 
Power of  in place: no 
Patient requires a blocked bed: no          Reason for blocked bed: VTE Prophylaxis: No 
 
Mobility needs/Fall risk: no 
Flu Vaccine : no  
Nutritional Plan: no 
Consults:         
Labs/Testing due today?: no 
 
Sleep hours:  3.5 Participation in Care/Groups:New admit Medication Compliant?: New admit PRNS (last 24 hours): Antianxiety and Sleep Aid Restraints (last 24 hours):  no 
  
CIWA (range last 24 hours): COWS (range last 24 hours): Alcohol screening (AUDIT) completed - If applicable, date SBIRT discussed in treatment team AND documented:  
AUDIT Screen Score: Document Brief Intervention (corresponds directly with the 5 A's, Ask, Advise, Assess, Assist, and Arrange): At- Risk Patients (Score 7-15 for women; 8-15 for men) Discuss concern patient is drinking at unhealthy levels known to increase risk of alcohol-related health problems. Is Patient ready to commit to change? If No: 
? Encourage reflection ? Discuss short term and long term health risks of consuming alcohol ? Barriers to change ? Reaffirm willingness to help / Educational materials provided If Yes: 
? Set goal 
? Plan 
? Educational materials provided Harmful use or Dependence (Score 16 or greater) ? Discuss short term and long term health risks of consuming alcohol ? Recommendations ? Negotiate drinking goal 
? Recommend addiction specialist/center ? Arrange follow-up appointments. Tobacco - patient is a smoker:   
Illegal Drugs use:   
 
24 hour chart check complete: yes Patient goal(s) for today:  
Treatment team focus/goals:  
Progress note LOS:  0  Expected LOS:  
 
Financial concerns/prescription coverage: Family contact:   
Family requesting physician contact today:   
Discharge plan: Access to weapons :      
Outpatient provider(s):  
Patient's preferred phone number for follow up call :  
Patient's preferred e-mail address : 
Participating treatment team members: Tiara Brandt, * (assigned SW),

## 2020-12-15 NOTE — BH NOTES
Behavioral Health Interdisciplinary Rounds     Patient Name: Sydni Dalton  Age: 31 y.o. Room/Bed:  734/02  Primary Diagnosis: <principal problem not specified>   Admission Status:      Readmission within 30 days:   Power of  in place:   Patient requires a blocked bed:           Reason for blocked bed:     VTE Prophylaxis:     Mobility needs/Fall risk:   Flu Vaccine :    Nutritional Plan:   Consults: **         Labs/Testing due today?:     Sleep hours:        Participation in Care/Groups:    Medication Compliant?:   PRNS (last 24 hours):     Restraints (last 24 hours):       CIWA (range last 24 hours):     COWS (range last 24 hours):      Alcohol screening (AUDIT) completed -         If applicable, date SBIRT discussed in treatment team AND documented:   AUDIT Screen Score:        Document Brief Intervention (corresponds directly with the 5 A's, Ask, Advise, Assess, Assist, and Arrange): At- Risk Patients (Score 7-15 for women; 8-15 for men)  Discuss concern patient is drinking at unhealthy levels known to increase risk of alcohol-related health problems. Is Patient ready to commit to change? If No:   Encourage reflection   Discuss short term and long term health risks of consuming alcohol   Barriers to change   Reaffirm willingness to help / Educational materials provided  If Yes:   Set goal  [employer] provided    Harmful use or Dependence (Score 16 or greater)   Discuss short term and long term health risks of consuming alcohol   Recommendations   Negotiate drinking goal   Recommend addiction specialist/center   Arrange follow-up appointments.     Tobacco - patient is a smoker: Have You Used Tobacco in the Past 30 Days: No  Illegal Drugs use: Have You Used Any Illegal Substances Over the Past 12 Months: Yes    24 hour chart check complete:      Patient goal(s) for today: meet treatment team, acclimate to unit  Treatment team focus/goals: assess needs for treatment and safe discharge. Progress note:   Alert and oriented x4. Patient slept well. Calm and cooperative in treatment team. Pt reported he is against SSRIs. Pt denied being diagnosed with Bipolar Disorder. Patient is waiting for his hearing. LOS:  1  Expected LOS: TBD     Financial concerns/prescription coverage: BLUE CROSS/[facility] PPO    Family contact:  YULISA signed on 12/15/20 Tracey Torres, wife, [phone]. Family requesting physician contact today: No     Discharge plan: To return home   Access to weapons: Yes, but they are removed from the home, per [facility] note      Outpatient provider(s): Ashley [facility]cleod/[phone]-counselor  Patient's preferred phone number for follow up call: Unknown   Patient's preferred e-mail address: Unknown     Participating treatment team members: Lisandro Rizzo MSW; Tanya Jack; Dr. Corazon Devine; Isaias Mcmullen RN.

## 2020-12-15 NOTE — PROGRESS NOTES
Problem: Anxiety  Goal: *Alleviation of anxiety  Outcome: Progressing Towards Goal     Problem: Patient Education: Go to Patient Education Activity  Goal: Patient/Family Education  Outcome: Progressing Towards Goal    1519: Pt in room calm and cooperative, will continue to monitor patient q15 minute checks.

## 2020-12-15 NOTE — ED NOTES
Awaiting placement. Remains medically cleared. 10:29 PM  Change of shift. Care of patient signed over to Dr. Abi Adams. Handoff complete.

## 2020-12-15 NOTE — PROGRESS NOTES
Admission Medication Reconciliation:    Information obtained from:  Patient interview, [facility]  RxQuery data available¹:  NO    Comments/Recommendations: Updated PTA meds/reviewed patient's allergies. 1)  Patient states that he only takes a daily loratadine and sometimes takes diphenhydramine to help him sleep. He does not want to be on loratadine while hospitalized. He reports being on risperidone in 2012 but he only took twice and then stopped because he didn't like they way it made him feel. He also was on Adderall previously but has been off of it for over a year. 2)  Medication changes (since last review): Added  - loratadine 10mg daily    Removed  - Adderall, ketorolac, ondansetron, prednisone    3)  The [place] Prescription Monitoring Program () was assessed to determine fill history of any controlled medications. The patient has NOT filled any controlled medications in the last 12 months. ¹RxQuery pharmacy benefit data reflects medications filled and processed through the patient's insurance, however   this data does NOT capture whether the medication was picked up or is currently being taken by the patient. Allergies:  Patient has no known allergies. Significant PMH/Disease States:   Past Medical History:   Diagnosis Date    Mental health disorder      Chief Complaint for this Admission:    Chief Complaint   Patient presents with    Mental Health Problem     Prior to Admission Medications:   Prior to Admission Medications   Prescriptions Last Dose Informant Taking?   loratadine (Claritin) 10 mg tablet   Yes   Sig: Take 10 mg by mouth daily.       Facility-Administered Medications: None     JULIEN Melara

## 2020-12-16 PROCEDURE — 65220000001 HC RM PRIVATE PSYCH

## 2020-12-16 PROCEDURE — 74011250637 HC RX REV CODE- 250/637: Performed by: NURSE PRACTITIONER

## 2020-12-16 PROCEDURE — 74011250637 HC RX REV CODE- 250/637: Performed by: PSYCHIATRY & NEUROLOGY

## 2020-12-16 RX ORDER — OLANZAPINE 5 MG/1
5 TABLET, ORALLY DISINTEGRATING ORAL
Status: DISCONTINUED | OUTPATIENT
Start: 2020-12-16 | End: 2020-12-17 | Stop reason: HOSPADM

## 2020-12-16 RX ORDER — OLANZAPINE 5 MG/1
5 TABLET, ORALLY DISINTEGRATING ORAL 2 TIMES DAILY
Status: DISCONTINUED | OUTPATIENT
Start: 2020-12-16 | End: 2020-12-17 | Stop reason: HOSPADM

## 2020-12-16 RX ADMIN — OLANZAPINE 5 MG: 5 TABLET, ORALLY DISINTEGRATING ORAL at 20:02

## 2020-12-16 RX ADMIN — OLANZAPINE 5 MG: 5 TABLET, FILM COATED ORAL at 10:09

## 2020-12-16 RX ADMIN — HYDROXYZINE HYDROCHLORIDE 50 MG: 50 TABLET, FILM COATED ORAL at 16:19

## 2020-12-16 RX ADMIN — TRAZODONE HYDROCHLORIDE 50 MG: 50 TABLET ORAL at 21:18

## 2020-12-16 RX ADMIN — OLANZAPINE 5 MG: 5 TABLET, ORALLY DISINTEGRATING ORAL at 16:20

## 2020-12-16 RX ADMIN — OLANZAPINE 5 MG: 5 TABLET, ORALLY DISINTEGRATING ORAL at 12:38

## 2020-12-16 RX ADMIN — HYDROXYZINE HYDROCHLORIDE 50 MG: 50 TABLET, FILM COATED ORAL at 10:08
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
## 2020-12-16 NOTE — PROGRESS NOTES
Pt receiving continuous q15m safety checks, pt currently restless, up in room. No distress noted, respiratory WNL. Supplemental lighting utilized. Blocked Bed Documentation:    Room number: 724/01  Type: Behavior  Rationale: Poor Sleep Pattern, history of impulsivity/lashing out at staff and peers. Anticipated duration: reassess each shift  Additional comments:pt slept 3 hours and pacing most of night shift requiring multiple PRNs to help reduce anxiety    Problem: Anxiety  Goal: *Alleviation of anxiety  12/15/2020 2307 by Jael MORALES  Outcome: Progressing Towards Goal  12/15/2020 1913 by Tamiko Blackmon  Outcome: Progressing Towards Goal     Problem: Falls - Risk of  Goal: *Absence of Falls  Description: Document Richard Merlin Fall Risk and appropriate interventions in the flowsheet.   12/15/2020 2307 by Tamiko Blackmon  Outcome: Progressing Towards Goal  Note: Fall Risk Interventions:       Medication Interventions: Teach patient to arise slowly      12/15/2020 1913 by Tamiko Blackmon  Outcome: Progressing Towards Goal  Note: Fall Risk Interventions:            Medication Interventions: Teach patient to arise slowly

## 2020-12-16 NOTE — INTERDISCIPLINARY ROUNDS
Behavioral Health Interdisciplinary Rounds Patient Name: Wilfredo Gracia  Age: 31 y.o. Room/Bed:  724/ Primary Diagnosis: <principal problem not specified> Admission Status: TDO Readmission within 30 days: no 
Power of  in place: no 
Patient requires a blocked bed: no          Reason for blocked bed: VTE Prophylaxis: No 
 
Mobility needs/Fall risk: no 
Flu Vaccine : no  
Nutritional Plan:  
Consults:         
Labs/Testing due today?: no 
 
Sleep hours:  3 Participation in Care/Groups:  yes Medication Compliant?: Yes PRNS (last 24 hours): Sleep Aid, antipsychotic PO antianxiety PO Restraints (last 24 hours):  no 
  
CIWA (range last 24 hours): COWS (range last 24 hours): Alcohol screening (AUDIT) completed - If applicable, date SBIRT discussed in treatment team AND documented:  
AUDIT Screen Score: Document Brief Intervention (corresponds directly with the 5 A's, Ask, Advise, Assess, Assist, and Arrange): \ At- Risk Patients (Score 7-15 for women; 8-15 for men) Discuss concern patient is drinking at unhealthy levels known to increase risk of alcohol-related health problems. Is Patient ready to commit to change? If No: 
? Encourage reflection ? Discuss short term and long term health risks of consuming alcohol ? Barriers to change ? Reaffirm willingness to help / Educational materials provided If Yes: 
? Set goal 
? Plan 
? Educational materials provided Harmful use or Dependence (Score 16 or greater) ? Discuss short term and long term health risks of consuming alcohol ? Recommendations ? Negotiate drinking goal 
? Recommend addiction specialist/center ? Arrange follow-up appointments. Tobacco - patient is a smoker: Have You Used Tobacco in the Past 30 Days: No 
Illegal Drugs use: Have You Used Any Illegal Substances Over the Past 12 Months: Yes 
 
24 hour chart check complete: yes Patient goal(s) for today: Treatment team focus/goals:  
Progress note LOS:  1  Expected LOS:  
 
Financial concerns/prescription coverage:  no 
Family contact:      
Family requesting physician contact today:   
Discharge plan: Access to weapons : no Outpatient provider(s):  
Patient's preferred phone number for follow up call :  
Patient's preferred e-mail address : 
Participating treatment team members: Gurpreet Oro, * (assigned SW),

## 2020-12-16 NOTE — BH NOTES
Behavioral Health Interdisciplinary Rounds     Patient Name: Wilfredo Gracia  Age: 31 y.o. Room/Bed:  724/  Primary Diagnosis: <principal problem not specified>   Admission Status:      Readmission within 30 days:   Power of  in place:   Patient requires a blocked bed:           Reason for blocked bed:     VTE Prophylaxis:     Mobility needs/Fall risk:   Flu Vaccine :    Nutritional Plan:   Consults:          Labs/Testing due today?:     Sleep hours:        Participation in Care/Groups:    Medication Compliant?:   PRNS (last 24 hours):     Restraints (last 24 hours):       CIWA (range last 24 hours):     COWS (range last 24 hours):      Alcohol screening (AUDIT) completed -         If applicable, date SBIRT discussed in treatment team AND documented:   AUDIT Screen Score:        Document Brief Intervention (corresponds directly with the 5 A's, Ask, Advise, Assess, Assist, and Arrange):  **    At- Risk Patients (Score 7-15 for women; 8-15 for men)  Discuss concern patient is drinking at unhealthy levels known to increase risk of alcohol-related health problems. Is Patient ready to commit to change? If No:   Encourage reflection   Discuss short term and long term health risks of consuming alcohol   Barriers to change   Reaffirm willingness to help / Educational materials provided  If Yes:   Set goal  PSC Info Group provided    Harmful use or Dependence (Score 16 or greater)   Discuss short term and long term health risks of consuming alcohol   Recommendations   Negotiate drinking goal   Recommend addiction specialist/center   Arrange follow-up appointments.     Tobacco - patient is a smoker: Have You Used Tobacco in the Past 30 Days: No  Illegal Drugs use: Have You Used Any Illegal Substances Over the Past 12 Months: Yes    24 hour chart check complete:        Patient goal(s) for today: attend groups, take medications  Treatment team focus/goals: titrate medication, coordinate follow up. Progress note: Starting Zyprexa today. Pt open and willing to seek treatment. LOS:  2  Expected LOS: TBD        Financial concerns/prescription coverage: BLUE CROSS/[facility] CROSS [facility] PPO    Family contact:  YULISA signed on 12/15/20 Alla Ferguson, wife, [phone]. Family requesting physician contact today: No     Discharge plan: To return home   Access to weapons: Yes, but they are removed from the home, per [facility] note                         Outpatient provider(s): Cinthya Mcleod/[phone]-counselor  Patient's preferred phone number for follow up call: Unknown   Patient's preferred e-mail address: Unknown     Participating treatment team members: Tamar Muro, JEN Graham; Morris Mack; Dr. Daiana Walker; Bere Guzman RN.

## 2020-12-16 NOTE — PROGRESS NOTES
Laboratory Monitoring for Antipsychotics: This patient is currently prescribed the following medication(s):   Current Facility-Administered Medications   Medication Dose Route Frequency    OLANZapine (ZyPREXA zydis) disintegrating tablet 5 mg  5 mg Oral BID     The following labs have been completed for monitoring of antipsychotics and/or mood stabilizers:    Height, Weight, BMI Estimation  Estimated body mass index is 24.41 kg/m² as calculated from the following:    Height as of this encounter: 180.3 cm (71\"). Weight as of this encounter: 79.4 kg (175 lb). Vital Signs/Blood Pressure  Visit Vitals  BP (!) 154/84   Pulse 84   Temp 97.6 °F (36.4 °C)   Resp 16   Ht 180.3 cm (71\")   Wt 79.4 kg (175 lb)   SpO2 96%   BMI 24.41 kg/m²     Renal Function, Hepatic Function and Chemistry  Estimated Creatinine Clearance: 102.7 mL/min (based on SCr of 1.11 mg/dL). Lab Results   Component Value Date/Time    Sodium 138 12/14/2020 01:19 PM    Potassium 4.0 12/14/2020 01:19 PM    Chloride 107 12/14/2020 01:19 PM    CO2 25 12/14/2020 01:19 PM    Anion gap 6 12/14/2020 01:19 PM    BUN 11 12/14/2020 01:19 PM    Creatinine 1.11 12/14/2020 01:19 PM    BUN/Creatinine ratio 10 (L) 12/14/2020 01:19 PM    Bilirubin, total 0.5 12/14/2020 01:19 PM    Protein, total 8.3 (H) 12/14/2020 01:19 PM    Albumin 4.8 12/14/2020 01:19 PM    Globulin 3.5 12/14/2020 01:19 PM    A-G Ratio 1.4 12/14/2020 01:19 PM    ALT (SGPT) 39 12/14/2020 01:19 PM    AST (SGOT) 27 12/14/2020 01:19 PM    Alk.  phosphatase 99 12/14/2020 01:19 PM     Lab Results   Component Value Date/Time    Glucose 98 12/14/2020 01:19 PM     No results found for: HBA1C, HGBE8, IXA9WERH    Hematology  Lab Results   Component Value Date/Time    WBC 9.9 12/14/2020 01:19 PM    RBC 5.27 12/14/2020 01:19 PM    HGB 16.3 12/14/2020 01:19 PM    HCT 47.9 12/14/2020 01:19 PM    MCV 90.9 12/14/2020 01:19 PM    MCH 30.9 12/14/2020 01:19 PM    MCHC 34.0 12/14/2020 01:19 PM    RDW 13.0 12/14/2020 01:19 PM    PLATELET 406 65/03/6858 01:19 PM     Lipids  No results found for: CHOL, CHOLX, CHLST, CHOLV, 171141, HDL, HDLP, LDL, LDLC, DLDLP, TGLX, TRIGL, TRIGP, CHHD, CHHDX    Thyroid Function  Lab Results   Component Value Date/Time    TSH 1.77 12/14/2020 01:19 PM     Assessment/Plan:  Will order lipid panel and hemoglobin A1c or fasting glucose to complete the recommended baseline laboratory monitoring based on the patient's current medication regimen.         Cj Castellon, PHARMD

## 2020-12-16 NOTE — BH NOTES
Chief Complaint:  I am upset about being here    Length of Stay: 2 Days    Interval History:  Mr. Kareen Hernandez reports feeling a little better after he took Zyprexa and Vistaril last evening and today. Says he slept better after that. He feels upset about having to be in the hospital and has a hearing scheduled for tomorrow. He is willing to take Zyprexa which was beneficial to him. Says he has made contact with his wife and MIL and thinks they will support his release during the hearing. No episodes of agitation or aggression. Pleasant and calm during the interview. Past Medical History:  Past Medical History:   Diagnosis Date    Mental health disorder            Labs:  Lab Results   Component Value Date/Time    WBC 9.9 12/14/2020 01:19 PM    HGB 16.3 12/14/2020 01:19 PM    HCT 47.9 12/14/2020 01:19 PM    PLATELET 929 08/71/5202 01:19 PM    MCV 90.9 12/14/2020 01:19 PM      Lab Results   Component Value Date/Time    Sodium 138 12/14/2020 01:19 PM    Potassium 4.0 12/14/2020 01:19 PM    Chloride 107 12/14/2020 01:19 PM    CO2 25 12/14/2020 01:19 PM    Anion gap 6 12/14/2020 01:19 PM    Glucose 98 12/14/2020 01:19 PM    BUN 11 12/14/2020 01:19 PM    Creatinine 1.11 12/14/2020 01:19 PM    BUN/Creatinine ratio 10 (L) 12/14/2020 01:19 PM    GFR est AA >60 12/14/2020 01:19 PM    GFR est non-AA >60 12/14/2020 01:19 PM    Calcium 9.7 12/14/2020 01:19 PM    Bilirubin, total 0.5 12/14/2020 01:19 PM    Alk.  phosphatase 99 12/14/2020 01:19 PM    Protein, total 8.3 (H) 12/14/2020 01:19 PM    Albumin 4.8 12/14/2020 01:19 PM    Globulin 3.5 12/14/2020 01:19 PM    A-G Ratio 1.4 12/14/2020 01:19 PM    ALT (SGPT) 39 12/14/2020 01:19 PM      Vitals:    12/15/20 1619 12/15/20 1922 12/15/20 2239 12/16/20 0740   BP: 123/85 (!) 127/92  (!) 154/84   Pulse: 82 73  84   Resp: 16 18  16   Temp: 97.8 °F (36.6 °C) 97.4 °F (36.3 °C) 97.6 °F (36.4 °C) 97.6 °F (36.4 °C)   SpO2: 98% 97%  96%   Weight:       Height:             Current Facility-Administered Medications   Medication Dose Route Frequency Provider Last Rate Last Admin    OLANZapine (ZyPREXA) tablet 5 mg  5 mg Oral Q6H PRN Yasmine Faith, NP   5 mg at 12/16/20 1009    haloperidol lactate (HALDOL) injection 5 mg  5 mg IntraMUSCular Q6H PRN Yasmine Faith, NP   5 mg at 12/15/20 0515    benztropine (COGENTIN) tablet 1 mg  1 mg Oral BID PRN Brandin Faith, KIMBERLY        diphenhydrAMINE (BENADRYL) injection 50 mg  50 mg IntraMUSCular BID PRN Yasmine Faith, NP   50 mg at 12/15/20 0517    hydrOXYzine HCL (ATARAX) tablet 50 mg  50 mg Oral TID PRN Tristin Faith, NP   50 mg at 12/16/20 1008    LORazepam (ATIVAN) injection 1 mg  1 mg IntraMUSCular Q4H PRN Yasmine Faith, NP   1 mg at 12/15/20 0515    traZODone (DESYREL) tablet 50 mg  50 mg Oral QHS PRN Yasmine Faith, NP   50 mg at 12/15/20 2035    acetaminophen (TYLENOL) tablet 650 mg  650 mg Oral Q4H PRN Brandin Faith NP        magnesium hydroxide (MILK OF MAGNESIA) 400 mg/5 mL oral suspension 30 mL  30 mL Oral DAILY PRN Brandin Faith NP             Mental Status Exam:  Eye contact: fair  Grooming: fair  Psychomotor activity: WNL  Speech is spontaneous  Mood is \"upset\"  Affect: mildly irritable  Perception: Denies any AH or VH  Suicidal ideation: Denies any SI or plan. Cognition is grossly intact       Physical Exam:  Body habitus: Body mass index is 24.41 kg/m². 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 Continue the medication regimen as prescribed  Disposition planning to continue.    I certify that this patients inpatient psychiatric hospital services furnished since the previous certification were, and continue to be, required for treatment that could reasonably be expected to improve the patient's condition, or for diagnostic study, and that the patient continues to need, on a daily basis, active treatment furnished directly by or requiring the supervision of inpatient psychiatric facility personnel. In addition, the hospital records show that services furnished were intensive treatment services, admission or related services, or equivalent services.

## 2020-12-17 VITALS
WEIGHT: 175 LBS | SYSTOLIC BLOOD PRESSURE: 133 MMHG | BODY MASS INDEX: 24.5 KG/M2 | HEART RATE: 91 BPM | HEIGHT: 71 IN | OXYGEN SATURATION: 97 % | RESPIRATION RATE: 16 BRPM | DIASTOLIC BLOOD PRESSURE: 83 MMHG | TEMPERATURE: 98.1 F

## 2020-12-17 LAB
CHOLEST SERPL-MCNC: 152 MG/DL
EST. AVERAGE GLUCOSE BLD GHB EST-MCNC: 85 MG/DL
HBA1C MFR BLD: 4.6 % (ref 4–5.6)
HDLC SERPL-MCNC: 40 MG/DL
HDLC SERPL: 3.8 {RATIO} (ref 0–5)
LDLC SERPL CALC-MCNC: 69.8 MG/DL (ref 0–100)
LIPID PROFILE,FLP: ABNORMAL
TRIGL SERPL-MCNC: 211 MG/DL (ref ?–150)
VLDLC SERPL CALC-MCNC: 42.2 MG/DL

## 2020-12-17 PROCEDURE — 74011250637 HC RX REV CODE- 250/637: Performed by: PSYCHIATRY & NEUROLOGY

## 2020-12-17 PROCEDURE — 80061 LIPID PANEL: CPT

## 2020-12-17 PROCEDURE — 36415 COLL VENOUS BLD VENIPUNCTURE: CPT

## 2020-12-17 PROCEDURE — 83036 HEMOGLOBIN GLYCOSYLATED A1C: CPT

## 2020-12-17 PROCEDURE — 74011250637 HC RX REV CODE- 250/637: Performed by: NURSE PRACTITIONER

## 2020-12-17 RX ADMIN — OLANZAPINE 5 MG: 5 TABLET, ORALLY DISINTEGRATING ORAL at 08:52

## 2020-12-17 RX ADMIN — HYDROXYZINE HYDROCHLORIDE 50 MG: 50 TABLET, FILM COATED ORAL at 06:09

## 2020-12-17 RX ADMIN — OLANZAPINE 5 MG: 5 TABLET, ORALLY DISINTEGRATING ORAL at 00:52

## 2020-12-17 RX ADMIN — HYDROXYZINE HYDROCHLORIDE 50 MG: 50 TABLET, FILM COATED ORAL at 00:22

## 2020-12-17 NOTE — INTERDISCIPLINARY ROUNDS
Behavioral Health Interdisciplinary Rounds Patient Name: Daja Phillips  Age: 31 y.o. Room/Bed:  724/ Primary Diagnosis: <principal problem not specified> Admission Status: Released / TDO Hearing Readmission within 30 days: no 
Power of  in place: no 
Patient requires a blocked bed: no          Reason for blocked bed: VTE Prophylaxis: No 
 
Mobility needs/Fall risk: no 
Flu Vaccine :   
Nutritional Plan: no 
Consults:         
Labs/Testing due today?: no 
 
Sleep hours:  1 Participation in Care/Groups:  yes Medication Compliant?: Yes PRNS (last 24 hours): Antianxiety and Sleep Aid Restraints (last 24 hours):  no 
  
CIWA (range last 24 hours): COWS (range last 24 hours): Alcohol screening (AUDIT) completed - If applicable, date SBIRT discussed in treatment team AND documented:  
AUDIT Screen Score: Document Brief Intervention (corresponds directly with the 5 A's, Ask, Advise, Assess, Assist, and Arrange): At- Risk Patients (Score 7-15 for women; 8-15 for men) Discuss concern patient is drinking at unhealthy levels known to increase risk of alcohol-related health problems. Is Patient ready to commit to change? If No: 
? Encourage reflection ? Discuss short term and long term health risks of consuming alcohol ? Barriers to change ? Reaffirm willingness to help / Educational materials provided If Yes: 
? Set goal 
? Plan 
? Educational materials provided Harmful use or Dependence (Score 16 or greater) ? Discuss short term and long term health risks of consuming alcohol ? Recommendations ? Negotiate drinking goal 
? Recommend addiction specialist/center ? Arrange follow-up appointments. Tobacco - patient is a smoker: Have You Used Tobacco in the Past 30 Days: No 
Illegal Drugs use: Have You Used Any Illegal Substances Over the Past 12 Months: Yes 
 
24 hour chart check complete: yes Patient goal(s) for today:  D/C  
 Treatment team focus/goals: Pt was not seen by [place] team but he was d/c from his TDO Hearing Progress note Good mood and happy to be going home LOS:  3  Expected LOS:  
 
Financial concerns/prescription coverage: [employer] Family contact:      
Family requesting physician contact today:  
Discharge plan: Return to live with his spouse Access to weapons :  Documented that guns have been removed form his home Outpatient provider(s): Suzie Gregory  Services Patient's preferred phone number for follow up call :  
Patient's preferred e-mail address : 
Participating treatment team members: Cayetano Dance, N Louis Nab and BHARATI Lloyd RN

## 2020-12-17 NOTE — BH NOTES
Behavioral Health Transition Record to Provider    Patient Name: Tamar Muro  YOB: 1990  Medical Record Number: [id number]  Date of Admission: 12/14/2020  Date of Discharge: 12/17/2020    Attending Provider: No att. providers found  Discharging Provider: DR Karen Joseph   To contact this individual call [phone] and ask the  to page. If unavailable, ask to be transferred to [facility] Provider on call. Josue Magali Provider will be available on call 24/7 and during holidays. Primary Care Provider: Russ, MD Yomaira    No Known Allergies    Reason for Admission:     CHIEF COMPLAINT:  \"There was a big misunderstanding. \"     HISTORY OF PRESENT ILLNESS:  The patient is a 69-year-old  man who is currently admitted after he was brought in an [facility] to the ER along with his wife and mother-in-law. The family had reported that the patient had been very different in his behavior recently and seemed to be having a manic episode. He had reportedly consumed a large quantity of LSD and had been sleeping poorly. They had reported that he was easy to anger, was talking excessively and expressing some grandiose delusions. After his arrival on the unit, the patient got agitated and had to be given IM Ativan and haloperidol following which he settled down and was calmer and was able to sleep most of the night. During the interview, he denies most of the symptoms reported by his family and says that his mother-in-law hates him and that is why she wanted him in the hospital.  He denies any of the symptoms reported. States that he used LSD about 2 weeks ago and periodically uses small quantities. His urine drug screen is positive for marijuana and says that he does not remember when he last used it. Denies use of alcohol or other substances. States that he wants to leave the hospital and his TDO hearing is scheduled most likely for tomorrow.   Denies any psychotic symptoms at the present time.        Admission Diagnosis: Bipolar affective disorder, current episode manic with psychotic symptoms ([facility] [address] [F31.2]    * No surgery found *    Results for orders placed or performed during the hospital encounter of 12/14/20   URINE CULTURE HOLD SAMPLE    Specimen: Serum   Result Value Ref Range    Urine culture hold        Urine on hold in Microbiology dept for 2 days. If unpreserved urine is submitted, it cannot be used for addtional testing after 24 hours, recollection will be required. SARS-COV-2, PCR    Specimen: Nasopharyngeal   Result Value Ref Range    Specimen source Nasopharyngeal      SARS-CoV-2 Not detected NOTD     CBC WITH AUTOMATED DIFF   Result Value Ref Range    WBC 9.9 4.1 - 11.1 K/uL    RBC 5.27 4. 10 - 5.70 M/uL    HGB 16.3 12.1 - 17.0 g/dL    HCT 47.9 36.6 - 50.3 %    MCV 90.9 80.0 - 99.0 FL    MCH 30.9 26.0 - 34.0 PG    MCHC 34.0 30.0 - 36.5 g/dL    RDW 13.0 11.5 - 14.5 %    PLATELET 476 856 - 139 K/uL    MPV 9.2 8.9 - 12.9 FL    NRBC 0.0 0  WBC    ABSOLUTE NRBC 0.00 0.00 - 0.01 K/uL    NEUTROPHILS 70 32 - 75 %    LYMPHOCYTES 20 12 - 49 %    MONOCYTES 8 5 - 13 %    EOSINOPHILS 0 0 - 7 %    BASOPHILS 1 0 - 1 %    IMMATURE GRANULOCYTES 1 (H) 0.0 - 0.5 %    ABS. NEUTROPHILS 7.0 1.8 - 8.0 K/UL    ABS. LYMPHOCYTES 1.9 0.8 - 3.5 K/UL    ABS. MONOCYTES 0.8 0.0 - 1.0 K/UL    ABS. EOSINOPHILS 0.0 0.0 - 0.4 K/UL    ABS. BASOPHILS 0.1 0.0 - 0.1 K/UL    ABS. IMM.  GRANS. 0.1 (H) 0.00 - 0.04 K/UL    DF AUTOMATED     METABOLIC PANEL, COMPREHENSIVE   Result Value Ref Range    Sodium 138 136 - 145 mmol/L    Potassium 4.0 3.5 - 5.1 mmol/L    Chloride 107 97 - 108 mmol/L    CO2 25 21 - 32 mmol/L    Anion gap 6 5 - 15 mmol/L    Glucose 98 65 - 100 mg/dL    BUN 11 6 - 20 MG/DL    Creatinine 1.11 0.70 - 1.30 MG/DL    BUN/Creatinine ratio 10 (L) 12 - 20      GFR est AA >60 >60 ml/min/1.73m2    GFR est non-AA >60 >60 ml/min/1.73m2    Calcium 9.7 8.5 - 10.1 MG/DL    Bilirubin, total 0.5 0.2 - 1.0 MG/DL    ALT (SGPT) 39 12 - 78 U/L    AST (SGOT) 27 15 - 37 U/L    Alk.  phosphatase 99 45 - 117 U/L    Protein, total 8.3 (H) 6.4 - 8.2 g/dL    Albumin 4.8 3.5 - 5.0 g/dL    Globulin 3.5 2.0 - 4.0 g/dL    A-G Ratio 1.4 1.1 - 2.2     ETHYL ALCOHOL   Result Value Ref Range    ALCOHOL(ETHYL),SERUM <10 <10 MG/DL   ACETAMINOPHEN   Result Value Ref Range    Acetaminophen level <2 (L) 10 - 30 ug/mL   SALICYLATE   Result Value Ref Range    Salicylate level <4.2 (L) 2.8 - 20.0 MG/DL   URINALYSIS W/ RFLX MICROSCOPIC   Result Value Ref Range    Color YELLOW/STRAW      Appearance CLEAR CLEAR      Specific gravity 1.024 1.003 - 1.030      pH (UA) 5.5 5.0 - 8.0      Protein Negative NEG mg/dL    Glucose Negative NEG mg/dL    Ketone TRACE (A) NEG mg/dL    Bilirubin Negative NEG      Blood TRACE (A) NEG      Urobilinogen 0.2 0.2 - 1.0 EU/dL    Nitrites Negative NEG      Leukocyte Esterase Negative NEG      WBC 0-4 0 - 4 /hpf    RBC 0-5 0 - 5 /hpf    Epithelial cells FEW FEW /lpf    Bacteria Negative NEG /hpf    Hyaline cast 0-2 0 - 5 /lpf   DRUG SCREEN, URINE   Result Value Ref Range    AMPHETAMINES Negative NEG      BARBITURATES Negative NEG      BENZODIAZEPINES Negative NEG      COCAINE Negative NEG      METHADONE Negative NEG      OPIATES Negative NEG      PCP(PHENCYCLIDINE) Negative NEG      THC (TH-CANNABINOL) Positive (A) NEG      Drug screen comment (NOTE)    SARS-COV-2   Result Value Ref Range    Specimen source Nasopharyngeal      Specimen source Nasopharyngeal      COVID-19 rapid test Not detected NOTD      Specimen type NP Swab     TSH 3RD GENERATION   Result Value Ref Range    TSH 1.77 0.36 - 3.74 uIU/mL   LIPID PANEL   Result Value Ref Range    LIPID PROFILE          Cholesterol, total 152 <200 MG/DL    Triglyceride 211 (H) <150 MG/DL    HDL Cholesterol 40 MG/DL    LDL, calculated 69.8 0 - 100 MG/DL    VLDL, calculated 42.2 MG/DL    CHOL/HDL Ratio 3.8 0.0 - 5.0     HEMOGLOBIN A1C WITH EAG   Result Value Ref Range Hemoglobin A1c 4.6 4.0 - 5.6 %    Est. average glucose 85 mg/dL       Immunizations administered during this encounter: There is no immunization history on file for this patient. Screening for Metabolic Disorders for Patients on Antipsychotic Medications  (Data obtained from the EMR)    Estimated Body Mass Index  Estimated body mass index is 24.41 kg/m² as calculated from the following:    Height as of this encounter: 5' 11\" (1.803 m). Weight as of this encounter: 79.4 kg (175 lb). Vital Signs/Blood Pressure  Visit Vitals  /83 (BP 1 Location: Left arm, BP Patient Position: Sitting)   Pulse 91   Temp 98.1 °F (36.7 °C)   Resp 16   Ht 5' 11\" (1.803 m)   Wt 79.4 kg (175 lb)   SpO2 97%   BMI 24.41 kg/m²       Blood Glucose/Hemoglobin A1c  Lab Results   Component Value Date/Time    Glucose 98 12/14/2020 01:19 PM       Lab Results   Component Value Date/Time    Hemoglobin A1c 4.6 12/17/2020 04:45 AM        Lipid Panel  Lab Results   Component Value Date/Time    Cholesterol, total 152 12/17/2020 04:45 AM    HDL Cholesterol 40 12/17/2020 04:45 AM    LDL, calculated 69.8 12/17/2020 04:45 AM    Triglyceride 211 (H) 12/17/2020 04:45 AM    CHOL/HDL Ratio 3.8 12/17/2020 04:45 AM        Discharge Diagnosis: Mood Disorder unspecified     Luis Gould MD   Physician   PSYCHIATRY   [address] [facility] Notes   Signed   Date of Service:  12/16/20 1021                    [x]Hide copied text    []Jiver for details  Chief Complaint:  I am upset about being here     Length of Stay: 2 Days     Interval History:  Mr. Caremlo Smiley reports feeling a little better after he took Zyprexa and Vistaril last evening and today. Says he slept better after that. He feels upset about having to be in the hospital and has a hearing scheduled for tomorrow. He is willing to take Zyprexa which was beneficial to him. Says he has made contact with his wife and MIL and thinks they will support his release during the hearing.  No episodes of agitation or aggression. Pleasant and calm during the interview.         Past Medical History:       Past Medical History:   Diagnosis Date    Mental health disorder               Labs:        Lab Results   Component Value Date/Time     WBC 9.9 12/14/2020 01:19 PM     HGB 16.3 12/14/2020 01:19 PM     HCT 47.9 12/14/2020 01:19 PM     PLATELET 643 06/58/2072 01:19 PM     MCV 90.9 12/14/2020 01:19 PM            Lab Results   Component Value Date/Time     Sodium 138 12/14/2020 01:19 PM     Potassium 4.0 12/14/2020 01:19 PM     Chloride 107 12/14/2020 01:19 PM     CO2 25 12/14/2020 01:19 PM     Anion gap 6 12/14/2020 01:19 PM     Glucose 98 12/14/2020 01:19 PM     BUN 11 12/14/2020 01:19 PM     Creatinine 1.11 12/14/2020 01:19 PM     BUN/Creatinine ratio 10 (L) 12/14/2020 01:19 PM     GFR est AA >60 12/14/2020 01:19 PM     GFR est non-AA >60 12/14/2020 01:19 PM     Calcium 9.7 12/14/2020 01:19 PM     Bilirubin, total 0.5 12/14/2020 01:19 PM     Alk.  phosphatase 99 12/14/2020 01:19 PM     Protein, total 8.3 (H) 12/14/2020 01:19 PM     Albumin 4.8 12/14/2020 01:19 PM     Globulin 3.5 12/14/2020 01:19 PM     A-G Ratio 1.4 12/14/2020 01:19 PM     ALT (SGPT) 39 12/14/2020 01:19 PM             Vitals:     12/15/20 1619 12/15/20 1922 12/15/20 2239 12/16/20 0740   BP: 123/85 (!) 127/92   (!) 154/84   Pulse: 82 73   84   Resp: 16 18   16   Temp: 97.8 °F (36.6 °C) 97.4 °F (36.3 °C) 97.6 °F (36.4 °C) 97.6 °F (36.4 °C)   SpO2: 98% 97%   96%   Weight:           Height:                            Current Facility-Administered Medications   Medication Dose Route Frequency Provider Last Rate Last Admin    OLANZapine (ZyPREXA) tablet 5 mg  5 mg Oral Q6H PRN Resendiz-Refugio, Yasmine D, NP   5 mg at 12/16/20 1009    haloperidol lactate (HALDOL) injection 5 mg  5 mg IntraMUSCular Q6H PRN Yasmine Faith, NP   5 mg at 12/15/20 0515    benztropine (COGENTIN) tablet 1 mg  1 mg Oral BID PRN Daina Faith, KIMBERLY       [facility] diphenhydrAMINE (BENADRYL) injection 50 mg  50 mg IntraMUSCular BID PRN [facility], NP   50 mg at 12/15/20 0517    hydrOXYzine HCL (ATARAX) tablet 50 mg  50 mg Oral TID PRN [facility], NP   50 mg at 12/16/20 1008    LORazepam (ATIVAN) injection 1 mg  1 mg IntraMUSCular Q4H PRN [facility], NP   1 mg at 12/15/20 0515    traZODone (DESYREL) tablet 50 mg  50 mg Oral QHS PRN [facility], NP   50 mg at 12/15/20 2035    acetaminophen (TYLENOL) tablet 650 mg  650 mg Oral Q4H PRN Resendiz-RefugioAnais, NP        magnesium hydroxide (MILK OF MAGNESIA) 400 mg/5 mL oral suspension 30 mL  30 mL Oral DAILY PRN Resendiz-RefugioAnais, KIMBERLY                Mental Status Exam:  Eye contact: fair  Grooming: fair  Psychomotor activity: WNL  Speech is spontaneous  Mood is \"upset\"  Affect: mildly irritable  Perception: Denies any AH or VH  Suicidal ideation: Denies any SI or plan. Cognition is grossly intact        Physical Exam:  Body habitus: Body mass index is 24.41 kg/m². 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 Continue the medication regimen as prescribed  Disposition planning to continue. I certify that this patients inpatient psychiatric hospital services furnished since the previous certification were, and continue to be, required for treatment that could reasonably be expected to improve the patient's condition, or for diagnostic study, and that the patient continues to need, on a daily basis, active treatment furnished directly by or requiring the supervision of inpatient psychiatric facility personnel.  In addition, the hospital records show that services furnished were intensive treatment services, admission or related services, or equivalent services.                        DISCHARGE SUMMARY    Nhan Berrios      : 1990  MRN: [id number]    The patient Liliya Mcmillan exhibits the ability to control behavior in a less restrictive environment. Patient's level of functioning is improving. No assaultive/destructive behavior has been observed for the past 24 hours. No suicidal/homicidal threat or behavior has been observed for the past 24 hours. There is no evidence of serious medication side effects. Patient has not been in physical or protective restraints for at least the past 24 hours. If weapons involved, how are they secured? Patient removed guns from home. Is patient aware of and in agreement with discharge plan? Yes     Arrangements for medication:  Prescriptions were not  given to patient. Copy of discharge instructions to provider?: Neelam Cunningham [phone]    Arrangements for transportation home:  Family to . Keep all follow up appointments as scheduled, continue to take prescribed medications per physician instructions. Mental health crisis number:  168 or your local mental health crisis line number at [phone]        Patient released from hearing this morning. Provider discharging patient AMA, no prescriptions sent with patient- referred by provider back to primary care physician to manage medications and to Neelam Cunningham. Discharge Plan:     Discharge Medication List and Instructions:   Discharge Medication List as of 2020  8:17 AM          Unresulted Labs (24h ago, onward)    None        To obtain results of studies pending at discharge, please contact [phone]    Follow-up Information     Follow up With Specialties Details Why [address] [facility]  Call on 2020 To set up appointment for therapy.   [facility] Aroldo Mcdaniel  [phone]    [facility] Hospit[facility] Program (PHP)   Call on 12/17/2020 To set up patient with [facility] upon discharge  [address] [facility]  [phone]    [facility]  Call on 12/17/2020 To set up an appointment for medication management (psychiatry). [address]  [facility] [address]  [address] [facility]  Call To complete an intake and get connected to wrap around outpatient services. [address]  [place], [address] [facility]  [phone]    Other, MD Yomaira    Patient can only remember the practice name and not the physician            Advanced Directive:   Does the patient have an appointed surrogate decision maker? No  Does the patient have a Medical Advance Directive? No  Does the patient have a Psychiatric Advance Directive? No  If the patient does not have a surrogate or Medical Advance Directive AND Psychiatric Advance Directive, the patient was offered information on these advance directives Patient declined to complete    Patient Instructions: Please continue all medications until otherwise directed by physician. Tobacco Cessation Discharge Plan:   Is the patient a smoker and needs referral for smoking cessation? Yes  Patient referred to the following for smoking cessation with an appointment? Refused     Patient was offered medication to assist with smoking cessation at discharge? No  Was education for smoking cessation added to the discharge instructions? Yes    Alcohol/Substance Abuse Discharge Plan:   Does the patient have a history of substance/alcohol abuse and requires a referral for treatment? No  Patient referred to the following for substance/alcohol abuse treatment with an appointment? No  Patient was offered medication to assist with alcohol cessation at discharge? No  Was education for substance/alcohol abuse added to discharge instructions?  No    Patient discharged to Home; discussed with patient/caregiver and provided to the patient/caregiver either in hard copy or electronically.

## 2020-12-17 NOTE — GROUP NOTE
[facility] GROUP DOCUMENTATION INDIVIDUAL Group Therapy Note Date: 12/16/2020 Group Start Time: 2030 Group End Time: 2100 Group Topic: Reflection/Relaxation [address] [place] [address] Devora Learn [facility] GROUP DOCUMENTATION GROUP Group Therapy Note Attendees: 3/7 Attendance: Attended Patient's Goal:  N/A Interventions/techniques: Informed Follows Directions: Followed directions Interactions: Interacted appropriately Mental Status: Calm Behavior/appearance: Cooperative Goals Achieved: Able to engage in interactions Additional Notes: The patient participated in all group activities. The patient was able to perform all stretches and verbalize sleep hygiene techniques. The patient was also able to participate in breathing exercises and was able to identify relaxation techniques unique to them. Julius Mirza

## 2020-12-17 NOTE — PROGRESS NOTES
Problem: Anxiety  Goal: *Alleviation of anxiety  Outcome: Progressing Towards Goal     Problem: Falls - Risk of  Goal: *Absence of Falls  Description: Document Albino Messing Fall Risk and appropriate interventions in the flowsheet. Outcome: Progressing Towards Goal  Note: Fall Risk Interventions:       Medication Interventions: Teach patient to arise slowly         Pt received up on unit. Anxious, restless, pacing, NAD noted. Pt expresses anxiety about his TDO hearing this morning. Staff offers emotional support and reassurance. Pt is discharge focused, given education. Staff focus is on encouraging coping skills and medication compliance. Will continue to monitor with q15 minute checks for safety. Pt released from TDO hearing by courts. Pt wishes to discharge AMA, verbalizes understanding. Orders received from Dr. Joesph Euceda.    Pt's wife is coming to pick him up from hospital.

## 2020-12-17 NOTE — BH NOTES
PRN Medication Documentation    Specific patient behavior that led to need for PRN medication: anxiety  Staff interventions attempted prior to PRN being given: conversation   PRN medication given: atarax 50 mg PO  Patient response/effectiveness of PRN medication: resting, will monitor    PRN Medication Documentation    Specific patient behavior that led to need for PRN medication: agitation, not sleeping  Staff interventions attempted prior to PRN being given: prior anxiety med, conversation, deep breathing  PRN medication given: Zyprexa 5 mg PO  Patient response/effectiveness of PRN medication: resting, will monitor

## 2020-12-17 NOTE — PROGRESS NOTES
Problem: Anxiety  Goal: *Alleviation of anxiety  12/16/2020 2044 by Armando Spicer RN  Outcome: Progressing Towards Goal  Pt appropriately verbalizes his anxiety and is able to verbalizes several skills he uses to manage his symptoms. He verbalizes fair insight regarding events leading to admission. Atarax 50 mg  administered for c/o anxiety at 1619  Pt paces the weinstein and states his coping strategies are not effective at this time. Zyprexa 5 mg also administered as pt reports increased agitation and describes himself as being hypervigilant. 1700  Pt reports that his symptoms are decreasing, and is able to practice his coping skills.   2119  Trazadone 50 mg administered per pt request to promote rest.

## 2020-12-17 NOTE — PROGRESS NOTES
Problem: Falls - Risk of  Goal: *Absence of Falls  Description: Document Raji Chavez Fall Risk and appropriate interventions in the flowsheet. Outcome: Progressing Towards Goal  Note: Fall Risk Interventions:            Medication Interventions: Teach patient to arise slowly  Patient received, appears to be asleep, eyes closed, breathing even and unlabored. No signs of distress noted. Will continue to monitor for safety.

## 2020-12-17 NOTE — PROGRESS NOTES
Laboratory Monitoring for Antipsychotics: This patient is currently prescribed the following medication(s):   Current Facility-Administered Medications   Medication Dose Route Frequency    OLANZapine (ZyPREXA zydis) disintegrating tablet 5 mg  5 mg Oral BID     The following labs have been completed for monitoring of antipsychotics and/or mood stabilizers:    Height, Weight, BMI Estimation  Estimated body mass index is 24.41 kg/m² as calculated from the following:    Height as of this encounter: 180.3 cm (71\"). Weight as of this encounter: 79.4 kg (175 lb). Vital Signs/Blood Pressure  Visit Vitals  /83 (BP 1 Location: Left arm, BP Patient Position: Sitting)   Pulse 91   Temp 98.1 °F (36.7 °C)   Resp 16   Ht 180.3 cm (71\")   Wt 79.4 kg (175 lb)   SpO2 97%   BMI 24.41 kg/m²     Renal Function, Hepatic Function and Chemistry  Estimated Creatinine Clearance: 102.7 mL/min (by C-G formula based on SCr of 1.11 mg/dL). Lab Results   Component Value Date/Time    Sodium 138 12/14/2020 01:19 PM    Potassium 4.0 12/14/2020 01:19 PM    Chloride 107 12/14/2020 01:19 PM    CO2 25 12/14/2020 01:19 PM    Anion gap 6 12/14/2020 01:19 PM    BUN 11 12/14/2020 01:19 PM    Creatinine 1.11 12/14/2020 01:19 PM    BUN/Creatinine ratio 10 (L) 12/14/2020 01:19 PM    Bilirubin, total 0.5 12/14/2020 01:19 PM    Protein, total 8.3 (H) 12/14/2020 01:19 PM    Albumin 4.8 12/14/2020 01:19 PM    Globulin 3.5 12/14/2020 01:19 PM    A-G Ratio 1.4 12/14/2020 01:19 PM    ALT (SGPT) 39 12/14/2020 01:19 PM    AST (SGOT) 27 12/14/2020 01:19 PM    Alk.  phosphatase 99 12/14/2020 01:19 PM     Lab Results   Component Value Date/Time    Glucose 98 12/14/2020 01:19 PM     Lab Results   Component Value Date/Time    Hemoglobin A1c 4.6 12/17/2020 04:45 AM     Hematology  Lab Results   Component Value Date/Time    WBC 9.9 12/14/2020 01:19 PM    RBC 5.27 12/14/2020 01:19 PM    HGB 16.3 12/14/2020 01:19 PM    HCT 47.9 12/14/2020 01:19 PM    MCV 90.9 12/14/2020 01:19 PM    MCH 30.9 12/14/2020 01:19 PM    MCHC 34.0 12/14/2020 01:19 PM    RDW 13.0 12/14/2020 01:19 PM    PLATELET 340 36/52/5260 01:19 PM     Lipids  Lab Results   Component Value Date/Time    Cholesterol, total 152 12/17/2020 04:45 AM    HDL Cholesterol 40 12/17/2020 04:45 AM    LDL, calculated 69.8 12/17/2020 04:45 AM    Triglyceride 211 (H) 12/17/2020 04:45 AM    CHOL/HDL Ratio 3.8 12/17/2020 04:45 AM     Thyroid Function  Lab Results   Component Value Date/Time    TSH 1.77 12/14/2020 01:19 PM     Assessment/Plan:  Recommended baseline laboratory monitoring has been completed based on this patient's current medication regimen. No further interventions are needed at this time. Follow-up metabolic monitoring labs should be completed in 3 months (quarterly for the first year of antipsychotic therapy).      Christina Padilla, PHARMD

## 2020-12-21 NOTE — DISCHARGE SUMMARY
DISCHARGE SUMMARY    Some parts of the discharge summary are from the initial Psychiatric interview that was done on admission by the admitting psychiatrist.     Date of Admission: 12/14/2020    Date of Discharge: 12/21/2020     TYPE OF DISCHARGE:     AMA - YES   RELEASED BY THE TDO COURT - YES    REGULAR - NO    CHIEF COMPLAINT:  \"There was a big misunderstanding. \"     HISTORY OF PRESENT ILLNESS:  The patient is a 28-year-old  man who is currently admitted after he was brought in an ECO to the ER along with his wife and mother-in-law. The family had reported that the patient had been very different in his behavior recently and seemed to be having a manic episode. He had reportedly consumed a large quantity of LSD and had been sleeping poorly. They had reported that he was easy to anger, was talking excessively and expressing some grandiose delusions. After his arrival on the unit, the patient got agitated and had to be given IM Ativan and haloperidol following which he settled down and was calmer and was able to sleep most of the night. During the interview, he denies most of the symptoms reported by his family and says that his mother-in-law hates him and that is why she wanted him in the hospital.  He denies any of the symptoms reported. States that he used LSD about 2 weeks ago and periodically uses small quantities. His urine drug screen is positive for marijuana and says that he does not remember when he last used it. Denies use of alcohol or other substances. States that he wants to leave the hospital and his TDO hearing is scheduled most likely for tomorrow. Denies any psychotic symptoms at the present time.     PAST MEDICAL HISTORY:  Reviewed as per the history and physical exam.             Past Medical History:   Diagnosis Date    Mental health disorder                Prior to Admission medications    Medication Sig Start Date End Date Taking?  Authorizing Provider   loratadine (CLARITIN) 10 mg tablet Take 10 mg by mouth daily.     Yes Provider, Historical             Vitals:     12/15/20 1619 12/15/20 1922 12/15/20 2239 12/16/20 0740   BP: 123/85 (!) 127/92   (!) 154/84   Pulse: 82 73   84   Resp: 16 18   16   Temp: 97.8 °F (36.6 °C) 97.4 °F (36.3 °C) 97.6 °F (36.4 °C) 97.6 °F (36.4 °C)   SpO2: 98% 97%   96%   Weight:           Height:                    Lab Results   Component Value Date/Time     WBC 9.9 12/14/2020 01:19 PM     HGB 16.3 12/14/2020 01:19 PM     HCT 47.9 12/14/2020 01:19 PM     PLATELET 109 53/24/0492 01:19 PM     MCV 90.9 12/14/2020 01:19 PM            Lab Results   Component Value Date/Time     Sodium 138 12/14/2020 01:19 PM     Potassium 4.0 12/14/2020 01:19 PM     Chloride 107 12/14/2020 01:19 PM     CO2 25 12/14/2020 01:19 PM     Anion gap 6 12/14/2020 01:19 PM     Glucose 98 12/14/2020 01:19 PM     BUN 11 12/14/2020 01:19 PM     Creatinine 1.11 12/14/2020 01:19 PM     BUN/Creatinine ratio 10 (L) 12/14/2020 01:19 PM     GFR est AA >60 12/14/2020 01:19 PM     GFR est non-AA >60 12/14/2020 01:19 PM     Calcium 9.7 12/14/2020 01:19 PM     Bilirubin, total 0.5 12/14/2020 01:19 PM     Alk. phosphatase 99 12/14/2020 01:19 PM     Protein, total 8.3 (H) 12/14/2020 01:19 PM     Albumin 4.8 12/14/2020 01:19 PM     Globulin 3.5 12/14/2020 01:19 PM     A-G Ratio 1.4 12/14/2020 01:19 PM     ALT (SGPT) 39 12/14/2020 01:19 PM     AST (SGOT) 27 12/14/2020 01:19 PM      No results found for: VALF2, VALAC, VALP, VALPR, DS6, CRBAM, CRBAMP, CARB2, XCRBAM  No results found for: LITHM  RADIOLOGY REPORTS:(reviewed/updated 12/16/2020)  No results found.        PAST PSYCHIATRIC HISTORY:  The patient reports that he has never been in prior psychiatric treatment, but his mother-in-law had reported that he had been on Adderall about a year ago. There is also a suggestion that he was diagnosed with PTSD while in the [facility] but he denies this.   Denies any prior history of suicide attempts.     PSYCHOSOCIAL HISTORY:  The patient currently lives in [place] with his wife of 6 years. She is currently pregnant at about 4 months. 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 States that he was in combat but has never sought treatment at the [place] or tried to get a diagnosis for his symptoms. Denies any major legal or financial stressors. Currently, he works as a  for [employer] and says he enjoys his job.     MENTAL STATUS EXAMINATION:  The patient is a young [de-identified] man who is dressed in casual street clothes. He is calm and cooperative during the interview, although he got mildly agitated while talking about his mother-in-law. His speech is spontaneous and increased in output. Psychomotor activity is within normal limits. Mood is reported as being upset and affect is mildly irritable. Denies any active suicidal ideation or plan. Denies any perceptual abnormalities. Denies any delusions. His thought process is logical and goal directed. Cognitively, he is awake and alert, oriented to time, place, and person. Intelligence is average. Memory is intact and fund of knowledge is adequate. Insight is poor. Judgment is poor.     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     I will continue his inpatient stay. We will attempt to get corroborative information from his family.   He will be provided with support and attend groups.     Estimated length of stay is 5-7 days.     His strengths include his ability to seek help and support from his family.  [address]:  Patient was admitted to the inpatient psychiatry unit for acute psychiatric stabilization in regards to symptomatology as described in the HPI above and placed on Q15 minute checks and withdrawal precautions. While on the unit Cassi Vasquez was involved in individual, group, occupational and milieu therapy. Cassi Vasquez  was started back on the patients usual medication regimen as well as PRN medications including . Patient requested to be discharged and a TDO request was made with the local [facility]. A TDO was issued by the . Cassi Vasquez was evaluated by the mental health court special  and was declared not to meet criteria for continued stay in the hospital. Patient was released from mental health court and Cassi Vasquez opted to leave AMA and was discharged home. Patient is fully aware of the risks and benefits of staying in the hosptal for completion of treatment tvs. leaving the hospital AMA. Patient had expressed a desire to follow up with appointments and remains motivated to be in treatment after discharge. The patient verbalized understanding of discharge instructions. Discharge Medication List as of 12/17/2020  8:17 AM           Follow-up Information     Follow up With Specialties Details [facility]  Call on 12/17/2020 To set up appointment for therapy. [address]  [phone]    [facility] ([facility])   Call on 12/17/2020 To set up patient with [facility] upon discharge  [address] [facility]  [phone]    [facility]  Call on 12/17/2020 To set up an appointment for medication management (psychiatry). [address] [facility] [id number] [place] [address]  Call To complete an intake and get connected to wrap around outpatient services. [address]  [place], [address] [facility]  [phone]    Other, MD Yomaira    Patient can only remember the practice name and not the physician          WOUND CARE: none needed. PROGNOSIS:   Fair / Guarded based on nature of patient's pathology/ies and treatment compliance issues.   Prognosis is greatly dependent upon patient's ability to  follow up on psychiatric/psychotherapy appointments as well as to comply with psychiatric medications as prescribed which the individual has declined to do. Christine Harris

## 2020-12-22 NOTE — PROGRESS NOTES
Reached out to patient at [phone] for follow up. Spoke with patient who has connected with his therapist and is working on setting up his psychiatric follow up.

## 2021-04-02 ENCOUNTER — VIRTUAL VISIT (OUTPATIENT)
Dept: ENDOCRINOLOGY | Age: 31
End: 2021-04-02
Payer: COMMERCIAL

## 2021-04-02 DIAGNOSIS — T56.891A HYPOTHYROIDISM, LITHIUM INDUCED: Primary | ICD-10-CM

## 2021-04-02 DIAGNOSIS — E03.2 HYPOTHYROIDISM, LITHIUM INDUCED: Primary | ICD-10-CM

## 2021-04-02 PROBLEM — J30.9 ALLERGIC RHINITIS: Status: ACTIVE | Noted: 2021-04-02

## 2021-04-02 PROBLEM — J06.9 UPPER RESPIRATORY INFECTION: Status: ACTIVE | Noted: 2021-04-02

## 2021-04-02 PROBLEM — F31.9 BIPOLAR DISORDER (HCC): Status: ACTIVE | Noted: 2021-01-04

## 2021-04-02 PROBLEM — H91.90: Status: ACTIVE | Noted: 2021-04-02

## 2021-04-02 PROBLEM — Z11.1 SCREENING EXAMINATION FOR PULMONARY TUBERCULOSIS: Status: ACTIVE | Noted: 2021-04-02

## 2021-04-02 PROBLEM — M25.539 ARTHRALGIA OF WRIST: Status: ACTIVE | Noted: 2021-04-02

## 2021-04-02 PROBLEM — Z71.89 OTHER SPECIFIED COUNSELING: Status: ACTIVE | Noted: 2021-04-02

## 2021-04-02 PROBLEM — R46.89 BEHAVIOR PROBLEM IN CHILD: Status: ACTIVE | Noted: 2021-04-02

## 2021-04-02 PROBLEM — F17.200 CURRENT SMOKER: Status: ACTIVE | Noted: 2021-04-02

## 2021-04-02 PROBLEM — L60.0 INGROWING TOENAIL: Status: ACTIVE | Noted: 2021-04-02

## 2021-04-02 PROCEDURE — 99204 OFFICE O/P NEW MOD 45 MIN: CPT | Performed by: INTERNAL MEDICINE

## 2021-04-02 RX ORDER — LITHIUM CARBONATE 300 MG/1
CAPSULE ORAL
COMMUNITY

## 2021-04-02 RX ORDER — LITHIUM CARBONATE 450 MG/1
TABLET ORAL
COMMUNITY
Start: 2021-03-08 | End: 2021-04-02 | Stop reason: SDUPTHER

## 2021-04-02 RX ORDER — LITHIUM CARBONATE 450 MG/1
TABLET ORAL
COMMUNITY
Start: 2021-03-08

## 2021-04-02 RX ORDER — CETIRIZINE HCL 10 MG
10 TABLET ORAL DAILY
COMMUNITY
End: 2021-04-02 | Stop reason: SDUPTHER

## 2021-04-02 RX ORDER — LEVOTHYROXINE SODIUM 50 UG/1
50 TABLET ORAL
Qty: 90 TAB | Refills: 1 | Status: SHIPPED | OUTPATIENT
Start: 2021-04-02 | End: 2021-06-04 | Stop reason: SDUPTHER

## 2021-04-02 RX ORDER — HYDROXYZINE 50 MG/1
TABLET, FILM COATED ORAL
COMMUNITY
End: 2022-06-03 | Stop reason: ALTCHOICE

## 2021-04-02 RX ORDER — PRAZOSIN HYDROCHLORIDE 2 MG/1
CAPSULE ORAL
COMMUNITY

## 2021-04-02 RX ORDER — CETIRIZINE HCL 10 MG
10 TABLET ORAL DAILY
COMMUNITY
End: 2021-04-02 | Stop reason: ALTCHOICE

## 2021-04-02 RX ORDER — HYDROXYZINE 50 MG/1
TABLET, FILM COATED ORAL
COMMUNITY
End: 2021-04-02 | Stop reason: SDUPTHER

## 2021-04-02 RX ORDER — TEMAZEPAM 30 MG/1
30 CAPSULE ORAL
COMMUNITY

## 2021-04-02 RX ORDER — LITHIUM CARBONATE 300 MG/1
CAPSULE ORAL
COMMUNITY
End: 2021-04-02 | Stop reason: SDUPTHER

## 2021-04-02 RX ORDER — PRAZOSIN HYDROCHLORIDE 5 MG/1
CAPSULE ORAL
COMMUNITY
Start: 2021-03-08 | End: 2022-06-03 | Stop reason: ALTCHOICE

## 2021-04-02 RX ORDER — CETIRIZINE HCL 10 MG
10 TABLET ORAL DAILY
COMMUNITY

## 2021-04-02 NOTE — PROGRESS NOTES
Chief Complaint   Patient presents with    New Patient     [email or web address]    Thyroid Problem    Other     [facility]     History of Present Illness: Helena Cazares is a 31 y.o. male with a past medical history significant for bipolar disorder seen for discussion related to thyroid dysfunction in the setting of lithium therapy. Has been taking lithium since Jan. Was looking up sx of hypothyroidism, has been sluggish, feeling cold all the time. Is having a hard time losing weight- is 205 was 175 when graduated high school. Aunt and cousin with hypothyroidism. Does not take iodine or biotin. Takes magnesium at night. Past Medical History:   Diagnosis Date    Mental health disorder      History reviewed. No pertinent surgical history. Current Outpatient Medications   Medication Sig    cetirizine (ZYRTEC) 10 mg tablet Take 10 mg by mouth daily.  hydrOXYzine HCL (ATARAX) 50 mg tablet hydroxyzine HCl 50 mg tablet   TAKE 1 TABLET BY MOUTH 3 TIMES A DAY AS NEEDED    lithium carbonate 300 mg capsule lithium carbonate 300 mg capsule    lithium carbonate CR (ESKALITH CR) 450 mg CR tablet TAKE 2 TABLETS (900 MG) BY MOUTH ALONG WITH 600 MG FOR A TOTAL OF 1500 MG DAILY AT BEDTIME    prazosin (MINIPRESS) 2 mg capsule prazosin 2 mg capsule    prazosin (MINIPRESS) 5 mg capsule TAKE 1 CAPSULE BY MOUTH TWICE A DAY    temazepam (RestoriL) 30 mg capsule Take 30 mg by mouth nightly as needed for Sleep. No current facility-administered medications for this visit. No Known Allergies  History reviewed. No pertinent family history.     Social Hx: girlfriend Josh Hale  They live in [place]    Review of Systems:  - Constitutional Symptoms: difficulty with weight loss  - Eyes: no blurry vision or double vision  - Cardiovascular: no chest pain or palpitations  - Respiratory: no cough or shortness of breath  - Gastrointestinal: no dysphagia or abdominal pain  - Musculoskeletal: no joint pains or weakness  - Integumentary: no rashes  - Neurological: tremor in the hands for at least 5 years  - Psychiatric: no depression or anxiety  - Endocrine: Endorses cold intolerance    - Physical Examination:  - - GENERAL: NCAT, Appears well nourished   - EYES: EOMI, non-icteric, no proptosis   - Ear/Nose/Throat: NCAT, no visible inflammation or masses   - CARDIOVASCULAR: no cyanosis, no visible JVD   - RESPIRATORY: respiratory effort normal without any distress or labored breathing   - MUSCULOSKELETAL: Normal ROM of neck and upper extremities observed   - SKIN: No rash on face  - NEUROLOGIC:   Low amplitude high-frequency tremor with outstretched hands  - PSYCHIATRIC: Normal affect, Normal insight and judgement     Data Reviewed:   Prior to starting lithium:  Results for Shiraz Majano (MRN [id number]) as of 4/2/2021 11:53   Ref. Range 12/14/2020 13:19   TSH Latest Ref Range: 0.36 - 3.74 uIU/mL 1.77     02/2021:    03/2021:      Assessment/Plan: This is a very pleasant 62-year-old gentleman with past medical history significant for bipolar disorder recently begun on lithium therapy seen in referral from psychiatry for discussion related to thyroid dysfunction. Lithium increases intrathyroidal iodine content and inhibits the coupling of iodotyrosine residues deform T4 and T3. Hypothyroidism is a common side effect of lithium therapy. Given the trajectory of the thyroid function test and symptomatology, will initiate levothyroxine 50 mcg and repeat thyroid function tests in 8 weeks. Reviewed the appropriate way to administer levothyroxine, particularly that it should be  from divalent cations by 4 hours. 1. Hypothyroidism, lithium induced  -Initiate levothyroxine 50 mcg each morning, separate from magnesium by least 4 hours  - TSH 3RD GENERATION; Future  - T4, FREE;  Future    Return to care in June 4 at 1110    Copy sent Hilbert Meckel, MD Scottie Dear is a 31 y.o. male being evaluated by a Virtual Visit (video visit) encounter to address concerns as mentioned above. A caregiver was present when appropriate. Due to this being a TeleHealth encounter (During TJVHR-91 public health emergency), evaluation of the following organ systems was limited:     Vitals/Constitutional/EENT/Resp/CV/GI//MS/Neuro/Skin/Heme-Lymph-Imm. Pursuant to the emergency declaration under the [address] [place] [address] and the [employer] and [employer] Act, this Virtual Visit was conducted with patient's (and/or legal guardian's) consent, to reduce the risk of exposure to COVID-19 and provide necessary medical care. Services were provided through a video synchronous discussion virtually to substitute for in-person encounter. --Elsa Callahan MD on 4/2/2021 at 11:33 AM    An electronic signature was used to authenticate this note.

## 2021-05-02 PROBLEM — Z11.1 SCREENING EXAMINATION FOR PULMONARY TUBERCULOSIS: Status: RESOLVED | Noted: 2021-04-02 | Resolved: 2021-05-02

## 2021-06-02 LAB
T4 FREE SERPL-MCNC: 1.55 NG/DL (ref 0.82–1.77)
TSH SERPL DL<=0.005 MIU/L-ACNC: 4.7 UIU/ML (ref 0.45–4.5)

## 2021-06-02 NOTE — LETTER
6/2/2021 Mr. Ami Bach [facility] [address] [address] Dear Ami Bach: 
 
Please find your most recent results below. Resulted Orders T4, FREE Result Value Ref Range T4, Free 1.55 0.82 - 1.77 ng/dL Narrative Performed at:  [address]  [id number] : Darshan Estrada MD, Phone:  [phone] TSH 3RD GENERATION Result Value Ref Range TSH 4.700 (H) 0.450 - 4.500 uIU/mL Narrative Performed at:  [address]  [id number] : Darshan Estrada MD, Phone:  [phone] RECOMMENDATIONS: 
Radha Corral, Your TSH remains high. We will increase your levothyroxine to 75mcg at your visit in 2 days. Please call me if you have any questions: [phone] Sincerely, 
 
 
Arnoldo France MD

## 2021-06-04 ENCOUNTER — VIRTUAL VISIT (OUTPATIENT)
Dept: ENDOCRINOLOGY | Age: 31
End: 2021-06-04
Payer: COMMERCIAL

## 2021-06-04 DIAGNOSIS — E03.2 HYPOTHYROIDISM, LITHIUM INDUCED: Primary | ICD-10-CM

## 2021-06-04 DIAGNOSIS — T56.891A HYPOTHYROIDISM, LITHIUM INDUCED: Primary | ICD-10-CM

## 2021-06-04 PROCEDURE — 99213 OFFICE O/P EST LOW 20 MIN: CPT | Performed by: INTERNAL MEDICINE

## 2021-06-04 RX ORDER — LEVOTHYROXINE SODIUM 75 UG/1
75 TABLET ORAL
Qty: 90 TABLET | Refills: 3 | Status: SHIPPED | OUTPATIENT
Start: 2021-06-04 | End: 2022-06-01

## 2021-06-04 NOTE — PROGRESS NOTES
Chief Complaint   Patient presents with    Thyroid Problem     [email or web address]    Follow-up    Medication Refill     [facility]     History of Present Illness: Helena Cazares is a 31 y.o. male with a past medical history significant for bipolar disorder seen in follow-up for discussion related to thyroid dysfunction in the setting of lithium therapy. 04/02/2021:  Has been taking lithium since Jan. Was looking up sx of hypothyroidism, has been sluggish, feeling cold all the time. Is having a hard time losing weight- is 205 was 175 when graduated high school. Aunt and cousin with hypothyroidism. Does not take iodine or biotin. Takes magnesium at night. 06/04/2021: Is feeling more energy, noticed that his skin is less dry. Has not been missing doses of levothyroxine. Does not have any concerns related to taking the levothyroxine. History reviewed. No pertinent surgical history. Current Outpatient Medications   Medication Sig    cetirizine (ZYRTEC) 10 mg tablet Take 10 mg by mouth daily.  hydrOXYzine HCL (ATARAX) 50 mg tablet hydroxyzine HCl 50 mg tablet   TAKE 1 TABLET BY MOUTH 3 TIMES A DAY AS NEEDED    lithium carbonate 300 mg capsule lithium carbonate 300 mg capsule    lithium carbonate CR (ESKALITH CR) 450 mg CR tablet TAKE 2 TABLETS (900 MG) BY MOUTH ALONG WITH 600 MG FOR A TOTAL OF 1500 MG DAILY AT BEDTIME    prazosin (MINIPRESS) 2 mg capsule prazosin 2 mg capsule    prazosin (MINIPRESS) 5 mg capsule TAKE 1 CAPSULE BY MOUTH TWICE A DAY    temazepam (RestoriL) 30 mg capsule Take 30 mg by mouth nightly as needed for Sleep.  levothyroxine (SYNTHROID) 50 mcg tablet Take 1 Tab by mouth Daily (before breakfast). No current facility-administered medications for this visit.      Social Hx: girlfriend [address]  They live in [place]    Review of Systems:  - Eyes: no blurry vision or double vision  - Cardiovascular: no chest pain or palpitations  - Respiratory: no cough or shortness of breath  - Gastrointestinal: no dysphagia or abdominal pain  - Musculoskeletal: no joint pains or weakness  - Integumentary: no rashes  - Neurological: tremor in the hands for at least 5 years  - Psychiatric: no depression or anxiety    - Physical Examination:  - - GENERAL: NCAT, Appears well nourished   - EYES: EOMI, non-icteric, no proptosis   - Ear/Nose/Throat: NCAT, no visible inflammation or masses   - CARDIOVASCULAR: no cyanosis, no visible JVD   - RESPIRATORY: respiratory effort normal without any distress or labored breathing   - MUSCULOSKELETAL: Normal ROM of neck and upper extremities observed   - SKIN: No rash on face  - NEUROLOGIC:   Low amplitude high-frequency tremor with outstretched hands  - PSYCHIATRIC: Normal affect, Normal insight and judgement     Data Reviewed:     02/2021:    03/2021:        Assessment/Plan: This is a very pleasant 18-year-old gentleman with past medical history significant for bipolar disorder recently begun on lithium therapy seen in follow-up for discussion related to thyroid dysfunction. Lithium increases intrathyroidal iodine content and inhibits the coupling of iodotyrosine residues deform T4 and T3. Hypothyroidism is a common side effect of lithium therapy. TSH increased from 1.77 prior to lithium therapy to 7.2 following initiation. With levothyroxine 50 mcg, the TSH reduced to 4.7. Will increase levothyroxine to 75 mcg and repeat labs in 8 weeks. Reviewed the appropriate way to administer levothyroxine, particularly that it should be  from divalent cations by 4 hours.     #hypothyroidism, lithium induced  -TSH has improved from 7.3--->4.7 with initiation of levothyroxine 50 mcg  -Increase levothyroxine to 75 mcg and repeat labs 8 weeks later to ensure TSH has normalized    Return to care June 2022    Copy sent Amy Hightower MD    Riley Holcomb is a 31 y.o. male being evaluated by a Virtual Visit (video visit) encounter to address concerns as mentioned above. A caregiver was present when appropriate. Due to this being a TeleHealth encounter (During RRNJC-25 public health emergency), evaluation of the following organ systems was limited:     Vitals/Constitutional/EENT/Resp/CV/GI//MS/Neuro/Skin/Heme-Lymph-Imm. Pursuant to the emergency declaration under the [address] [place] [address], [address] [place] [address] and the [employer] and [employer] Act, this Virtual Visit was conducted with patient's (and/or legal guardian's) consent, to reduce the risk of exposure to COVID-19 and provide necessary medical care. Services were provided through a video synchronous discussion virtually to substitute for in-person encounter. --Thelma Owen MD on 6/4/2021 at 11:33 AM    An electronic signature was used to authenticate this note.

## 2021-06-04 NOTE — PROGRESS NOTES
Lab Results   Component Value Date/Time    TSH 4.700 (H) 06/01/2021 01:15 PM    T4, Free 1.55 06/01/2021 01:15 PM

## 2021-08-04 DIAGNOSIS — T56.891A HYPOTHYROIDISM, LITHIUM INDUCED: ICD-10-CM

## 2021-08-04 DIAGNOSIS — E03.2 HYPOTHYROIDISM, LITHIUM INDUCED: ICD-10-CM

## 2021-08-10 DIAGNOSIS — E03.2 HYPOTHYROIDISM, LITHIUM INDUCED: Primary | ICD-10-CM

## 2021-08-10 DIAGNOSIS — T56.891A HYPOTHYROIDISM, LITHIUM INDUCED: Primary | ICD-10-CM

## 2021-08-10 LAB
T4 FREE SERPL-MCNC: 1.56 NG/DL (ref 0.82–1.77)
TSH SERPL DL<=0.005 MIU/L-ACNC: 3.38 UIU/ML (ref 0.45–4.5)

## 2021-08-10 NOTE — LETTER
8/10/2021    Mr. Gisele Schulz [id number]      Dear Gisele aSnon:    Please find your most recent results below. Resulted Orders   TSH 3RD GENERATION   Result Value Ref Range    TSH 3.380 0.450 - 4.500 uIU/mL    Narrative    Performed at:  [address]  [id number]  : Salazar Tavares MD, Phone:  [phone]   T4, FREE   Result Value Ref Range    T4, Free 1.56 0.82 - 1.77 ng/dL    Narrative    Performed at:  [address]  [id number]  : Salazar Tavares MD, Phone:  [phone]       RECOMMENDATIONS:    Dov Barclay,    Thyroid function tests are normal on 75mcg of levothyroxine. We will repeat labs a week before your visit next year. I'll order the labs now. You should have plenty of refills available until that time.     Please call me if you have any questions: [phone]    Sincerely,      Ruy Mckeon MD

## 2022-03-18 PROBLEM — L60.0 INGROWING TOENAIL: Status: ACTIVE | Noted: 2021-04-02

## 2022-03-18 PROBLEM — M25.539 ARTHRALGIA OF WRIST: Status: ACTIVE | Noted: 2021-04-02

## 2022-03-18 PROBLEM — R46.89 BEHAVIOR PROBLEM IN CHILD: Status: ACTIVE | Noted: 2021-04-02

## 2022-03-18 PROBLEM — H91.90: Status: ACTIVE | Noted: 2021-04-02

## 2022-03-18 PROBLEM — Z71.89 OTHER SPECIFIED COUNSELING: Status: ACTIVE | Noted: 2021-04-02

## 2022-03-19 PROBLEM — F31.2 BIPOLAR AFFECTIVE DISORDER, CURRENT EPISODE MANIC WITH PSYCHOTIC SYMPTOMS (HCC): Status: ACTIVE | Noted: 2020-12-14

## 2022-03-19 PROBLEM — J06.9 UPPER RESPIRATORY INFECTION: Status: ACTIVE | Noted: 2021-04-02

## 2022-03-19 PROBLEM — F31.9 BIPOLAR DISORDER (HCC): Status: ACTIVE | Noted: 2021-01-04

## 2022-03-19 PROBLEM — F17.200 CURRENT SMOKER: Status: ACTIVE | Noted: 2021-04-02

## 2022-03-19 PROBLEM — J30.9 ALLERGIC RHINITIS: Status: ACTIVE | Noted: 2021-04-02

## 2022-03-28 ENCOUNTER — TELEPHONE (OUTPATIENT)
Dept: ENDOCRINOLOGY | Age: 32
End: 2022-03-28

## 2022-03-28 NOTE — TELEPHONE ENCOUNTER
3/28/2022  2:12pm    Bria called because another doctor needs to check Migel's lithium level she was hoping to add it to the thyroid blood work. GX#[phone]      Thanks,  Amber Reusing

## 2022-03-29 DIAGNOSIS — T56.891A HYPOTHYROIDISM, LITHIUM INDUCED: Primary | ICD-10-CM

## 2022-03-29 DIAGNOSIS — E03.2 HYPOTHYROIDISM, LITHIUM INDUCED: Primary | ICD-10-CM

## 2022-03-29 NOTE — TELEPHONE ENCOUNTER
3/29/2022  11:07 AM    Mrs Kellie Nash left voicemail at 10am that she had missed your call concerning her 's labs. Please call her back at [phone].     Thanks,  Litzy Anderson

## 2022-03-30 NOTE — TELEPHONE ENCOUNTER
Pt was made aware:        Documentation        Dudley Benjamin MD  You 7 days ago     JL    That's fine I'll order lithium levels along with thyroid.     Message text

## 2022-04-04 NOTE — TELEPHONE ENCOUNTER
4/4/2022  4:46 PM    Pt wife called and stated she's still waiting for a call back regarding the messages below.  She can be reached at [phone]    ThanksAna M Officer

## 2022-04-05 NOTE — TELEPHONE ENCOUNTER
Spoke with Cass Palumbo to make her aware that that her  and I had last week concerning adding additional labs and he's aware his request was added to his lab order. Bria then apologized and  Stated that he forgot to make her aware.

## 2022-05-13 ENCOUNTER — TELEPHONE (OUTPATIENT)
Dept: ENDOCRINOLOGY | Age: 32
End: 2022-05-13

## 2022-05-13 NOTE — TELEPHONE ENCOUNTER
05/13/22    Pt left a message at 2:53PM, his asking a return call regarding lab order. He can be reach @ [phone].

## 2022-05-14 LAB
LITHIUM SERPL-SCNC: 0.9 MMOL/L (ref 0.5–1.2)
T4 FREE SERPL-MCNC: 1.37 NG/DL (ref 0.82–1.77)
TSH SERPL DL<=0.005 MIU/L-ACNC: 1.9 UIU/ML (ref 0.45–4.5)

## 2022-05-15 NOTE — LETTER
5/15/2022    Mr. Cheryl Schulz [id number]      Dear Cheryl Caputo    Thyroid levels are normal, Norris Romberg. Please be sure to provide the lithium level to the physician who is prescribing that med. See you soon.           Please call me if you have any questions: [phone]        Sincerely,      Arturo Stover MD

## 2022-06-01 RX ORDER — LEVOTHYROXINE SODIUM 75 UG/1
TABLET ORAL
Qty: 90 TABLET | Refills: 3 | Status: SHIPPED | OUTPATIENT
Start: 2022-06-01

## 2022-06-03 ENCOUNTER — VIRTUAL VISIT (OUTPATIENT)
Dept: ENDOCRINOLOGY | Age: 32
End: 2022-06-03
Payer: COMMERCIAL

## 2022-06-03 DIAGNOSIS — E03.2 HYPOTHYROIDISM DUE TO MEDICATION: Primary | ICD-10-CM

## 2022-06-03 DIAGNOSIS — L70.9 ACNE, UNSPECIFIED ACNE TYPE: ICD-10-CM

## 2022-06-03 PROCEDURE — 99214 OFFICE O/P EST MOD 30 MIN: CPT | Performed by: INTERNAL MEDICINE

## 2022-06-03 RX ORDER — QUETIAPINE FUMARATE 25 MG/1
TABLET, FILM COATED ORAL
COMMUNITY
Start: 2021-10-18

## 2022-06-03 RX ORDER — QUETIAPINE FUMARATE 50 MG/1
TABLET, FILM COATED ORAL
COMMUNITY
Start: 2021-10-18

## 2022-06-03 NOTE — PROGRESS NOTES
Chief Complaint   Patient presents with    Follow-up    Thyroid Problem     History of Present Illness: Gisele Sanon is a 27 y.o. male with a past medical history significant for bipolar disorder seen in follow-up for discussion related to thyroid dysfunction in the setting of lithium therapy. 04/02/2021:  Has been taking lithium since Jan. Was looking up sx of hypothyroidism, has been sluggish, feeling cold all the time. Is having a hard time losing weight- is 205 was 175 when graduated high school. Aunt and cousin with hypothyroidism. Does not take iodine or biotin. Takes magnesium at night. 06/04/2021: Is feeling more energy, noticed that his skin is less dry. Has not been missing doses of levothyroxine. Does not have any concerns related to taking the levothyroxine. 06/03/2022:Notes more acne on the upper back that is bothersome, has tried benzoyl peroxide topically which has lead to dryness. Has not established care with a PCP in the area, previously had a stable relationship with one in [facility] but this ended when he moved. Otherwise, has a mild sore throat today. Continues with levothyroxine. Seroquel was added to regimen. History reviewed. No pertinent surgical history. Current Outpatient Medications   Medication Sig    QUEtiapine (SEROquel) 25 mg tablet     QUEtiapine (SEROquel) 50 mg tablet     levothyroxine (SYNTHROID) 75 mcg tablet TAKE 1 TABLET BY MOUTH EVERY DAY BEFORE BREAKFAST    cetirizine (ZYRTEC) 10 mg tablet Take 10 mg by mouth daily.  lithium carbonate 300 mg capsule lithium carbonate 300 mg capsule    lithium carbonate CR (ESKALITH CR) 450 mg CR tablet TAKE 2 TABLETS (900 MG) BY MOUTH ALONG WITH 600 MG FOR A TOTAL OF 1500 MG DAILY AT BEDTIME    prazosin (MINIPRESS) 2 mg capsule prazosin 2 mg capsule    temazepam (RestoriL) 30 mg capsule Take 30 mg by mouth nightly as needed for Sleep. No current facility-administered medications for this visit.      Social Hx: girlfriend Damon Landers  They live in [place]    Review of Systems:  - Baseline tremor in hands  - See HPI otherwise    - Physical Examination:  - - GENERAL: Janeal Mech well nourished   - EYES: EOMI, non-icteric, no proptosis   - Ear/Nose/Throat: NCAT, no visible inflammation or masses   - CARDIOVASCULAR: no cyanosis, no visible JVD   - RESPIRATORY: respiratory effort normal without any distress or labored breathing   - MUSCULOSKELETAL: Normal ROM of neck and upper extremities observed   - SKIN: No rash on face  - NEUROLOGIC:   Low amplitude high-frequency tremor with outstretched hands (previoulsy)  - PSYCHIATRIC: Normal affect, Normal insight and judgement     Data Reviewed:     02/2021:    03/2021:        Assessment/Plan: This is a very pleasant 25-year-old gentleman with past medical history significant for bipolar disorder on lithium therapy seen in follow-up for discussion related to thyroid dysfunction. Lithium increases intrathyroidal iodine content and inhibits the coupling of iodotyrosine residues deform T4 and T3. Hypothyroidism is a common side effect of lithium therapy. TSH increased from 1.77 prior to lithium therapy to 7.2 following initiation. With levothyroxine 50 mcg, the TSH reduced to 4.7-->1.9 with 75mcg. Reviewed the appropriate way to administer levothyroxine, particularly that it should be  from divalent cations by 4 hours. Will place referral to derm if Carolinaerin Alegre determines provider who accepts his insurance as benzoyl peroxide has dried his skin. Otherwise, repeat TFTs in 6 mo, rtc in 1 year.     #hypothyroidism, lithium induced  -TSH has normalized with levothyroxine 75mcg  -repeat TSH and Free T4 in 6 mo    #ance, dry skin with benzoyl peroxide  -need to speak with dermatology about this as next steps are unclear  -I will place a referral     Return to care June 2023    Copy sent to: does not have PCP, needs to establish care, we discussed how to do so    Esdras Lira is a 27 y.o. male being evaluated by a Virtual Visit (video visit) encounter to address concerns as mentioned above. A caregiver was present when appropriate. Due to this being a TeleHealth encounter (During [facility] public health emergency), evaluation of the following organ systems was limited:     Vitals/Constitutional/EENT/Resp/CV/GI//MS/Neuro/Skin/Heme-Lymph-Imm. Pursuant to the emergency declaration under the [address] [place] [address] authority and the [employer] Act, this Virtual Visit was conducted with patient's (and/or legal guardian's) consent, to reduce the risk of exposure to COVID-19 and provide necessary medical care. Services were provided through a video synchronous discussion virtually to substitute for in-person encounter. --Brooklyn Terrazas MD on 6/3/2022 at 11:33 AM    An electronic signature was used to authenticate this note.

## 2022-11-27 NOTE — LETTER
11/27/2022    Mr. Leah Rowe [address] [facility] [address]      Dear Leah Olivo:    Please find your most recent results below. Resulted Orders   TSH 3RD GENERATION   Result Value Ref Range    TSH 2.670 0.450 - 4.500 uIU/mL    Narrative    Performed at:  [address]  [id number]  : Christa Virk MD, Phone:  [phone]   T4, FREE   Result Value Ref Range    T4, Free 1.29 0.82 - 1.77 ng/dL    Narrative    Performed at:  [address] [facility] [address], [place]  [id number]  : Christa Virk MD, Phone:  [phone]       RECOMMENDATIONS:  Waldo Salcedo,    Labs look great on 75mcg, no need to repeat before June appt.      Please call me if you have any questions: [phone]    Sincerely,      Russell Corcoran MD

## 2023-03-13 ENCOUNTER — HOSPITAL ENCOUNTER (OUTPATIENT)
Dept: GENERAL RADIOLOGY | Age: 33
Discharge: HOME | End: 2023-03-13
Payer: COMMERCIAL

## 2023-03-13 ENCOUNTER — TRANSCRIBE ORDER (OUTPATIENT)
Dept: REGISTRATION | Age: 33
End: 2023-03-13

## 2023-03-13 DIAGNOSIS — J45.909 ASTHMA: ICD-10-CM

## 2023-03-13 DIAGNOSIS — J45.909 ASTHMA: Primary | ICD-10-CM

## 2023-03-13 PROCEDURE — 71046 X-RAY EXAM CHEST 2 VIEWS: CPT

## 2023-05-15 ENCOUNTER — TELEPHONE (OUTPATIENT)
Age: 33
End: 2023-05-15

## 2023-05-15 DIAGNOSIS — F31.5 BIPOLAR DISORDER, CURRENT EPISODE DEPRESSED, SEVERE, WITH PSYCHOTIC FEATURES (HCC): ICD-10-CM

## 2023-05-15 DIAGNOSIS — E03.2 HYPOTHYROIDISM DUE TO MEDICAMENTS AND OTHER EXOGENOUS SUBSTANCES: Primary | ICD-10-CM

## 2023-05-15 NOTE — TELEPHONE ENCOUNTER
5/15/2023  3:22 PM    Pt called and stated he would like labs that his psychologist is requesting for him to do added to his existing lab order Dr. Evelyn Gooden has to avoid getting lab work done multiple times. Pt can be reached at [phone].     Thanks,   Jim Madrigal

## 2023-05-16 NOTE — TELEPHONE ENCOUNTER
Spoke with and he requested to have his lithium levels added to his upcoming coming lab work. Pt was made aware that his labs were ordered and he can go to have his labs drawn at his convenience.

## 2023-06-01 LAB
LITHIUM SERPL-SCNC: 0.9 MMOL/L (ref 0.5–1.2)
T4 SERPL-MCNC: 8 UG/DL (ref 4.5–12)
TSH SERPL DL<=0.005 MIU/L-ACNC: 2.44 UIU/ML (ref 0.45–4.5)

## 2023-06-01 RX ORDER — LEVOTHYROXINE SODIUM 0.07 MG/1
75 TABLET ORAL
Qty: 30 TABLET | Refills: 4 | Status: SHIPPED | OUTPATIENT
Start: 2023-06-01

## 2023-06-05 ENCOUNTER — OFFICE VISIT (OUTPATIENT)
Age: 33
End: 2023-06-05
Payer: COMMERCIAL

## 2023-06-05 VITALS
HEART RATE: 83 BPM | OXYGEN SATURATION: 97 % | DIASTOLIC BLOOD PRESSURE: 74 MMHG | TEMPERATURE: 98.8 F | RESPIRATION RATE: 19 BRPM | BODY MASS INDEX: 30.18 KG/M2 | HEIGHT: 71 IN | WEIGHT: 215.6 LBS | SYSTOLIC BLOOD PRESSURE: 112 MMHG

## 2023-06-05 DIAGNOSIS — E03.2 HYPOTHYROIDISM DUE TO MEDICAMENTS AND OTHER EXOGENOUS SUBSTANCES: Primary | ICD-10-CM

## 2023-06-05 DIAGNOSIS — F31.5 BIPOLAR DISORDER, CURRENT EPISODE DEPRESSED, SEVERE, WITH PSYCHOTIC FEATURES (HCC): ICD-10-CM

## 2023-06-05 PROCEDURE — 99214 OFFICE O/P EST MOD 30 MIN: CPT | Performed by: INTERNAL MEDICINE

## 2023-06-05 RX ORDER — LEVOTHYROXINE SODIUM 0.07 MG/1
75 TABLET ORAL
Qty: 90 TABLET | Refills: 3 | Status: SHIPPED | OUTPATIENT
Start: 2023-06-05

## 2023-06-05 RX ORDER — METFORMIN HYDROCHLORIDE 500 MG/1
TABLET, EXTENDED RELEASE ORAL
COMMUNITY
Start: 2023-05-05

## 2023-06-05 RX ORDER — FLUTICASONE FUROATE, UMECLIDINIUM BROMIDE AND VILANTEROL TRIFENATATE 200; 62.5; 25 UG/1; UG/1; UG/1
POWDER RESPIRATORY (INHALATION)
COMMUNITY
Start: 2023-05-22

## 2023-06-05 RX ORDER — SEMAGLUTIDE 0.25 MG/.5ML
0.25 INJECTION, SOLUTION SUBCUTANEOUS
Qty: 2 ML | Refills: 0 | Status: SHIPPED | COMMUNITY
Start: 2023-06-05

## 2023-06-05 RX ORDER — SEMAGLUTIDE 0.25 MG/.5ML
0.25 INJECTION, SOLUTION SUBCUTANEOUS
Qty: 2 ML | Refills: 0 | Status: SHIPPED | OUTPATIENT
Start: 2023-06-05

## 2023-06-05 RX ORDER — AZELASTINE HYDROCHLORIDE 137 UG/1
SPRAY, METERED NASAL
COMMUNITY
Start: 2023-05-15

## 2023-06-05 ASSESSMENT — PATIENT HEALTH QUESTIONNAIRE - PHQ9
SUM OF ALL RESPONSES TO PHQ QUESTIONS 1-9: 0
2. FEELING DOWN, DEPRESSED OR HOPELESS: 0
1. LITTLE INTEREST OR PLEASURE IN DOING THINGS: 0
SUM OF ALL RESPONSES TO PHQ QUESTIONS 1-9: 0
SUM OF ALL RESPONSES TO PHQ QUESTIONS 1-9: 0
SUM OF ALL RESPONSES TO PHQ9 QUESTIONS 1 & 2: 0
SUM OF ALL RESPONSES TO PHQ QUESTIONS 1-9: 0

## 2023-06-05 NOTE — PROGRESS NOTES
Patient identified with two identification factors, Name and Date of Birth. Chief Complaint   Patient presents with    Follow-up     Annual follow-up       /74 (Site: Left Upper Arm, Position: Sitting, Cuff Size: Large Adult)   Pulse 83   Temp 98.8 °F (37.1 °C) (Oral)   Resp 19   Ht 5' 11\" (1.803 m)   Wt 215 lb 9.6 oz (97.8 kg)   SpO2 97%   BMI 30.07 kg/m²       1. \"Have you been to the ER, urgent care clinic since your last visit? Hospitalized since your last visit? \" No    2. \"Have you seen or consulted any other health care providers outside of the [address] [place] [address] since your last visit? \" No
content  and inhibits the coupling of iodotyrosine residues deform T4 and T3. Hypothyroidism is a common side effect of lithium therapy. TSH increased from 1.77 prior to lithium therapy to 7.2 following initiation. With levothyroxine 50 mcg, the TSH reduced  to 4.7-->1.9 with 75mcg. Reviewed the appropriate way to administer levothyroxine, particularly that it should be  from divalent cations by 4 hours. Attempt to obtain GLP1 agonist 06/2023.       #hypothyroidism, lithium induced   -TSH has normalized with levothyroxine 75mcg   -repeat TSH and Free T4 in 6 mo     #BMI 33, CAT, HLD  -attempt to obtain wegovy, provided sample  -reviewed risk of nausea and pancreatitis      Return to care June 2024, if we get wegovy 3 mo     Mariah Boss [facility]

## 2023-06-21 RX ORDER — SEMAGLUTIDE 0.25 MG/.5ML
0.25 INJECTION, SOLUTION SUBCUTANEOUS
Qty: 2 ML | Refills: 0 | Status: CANCELLED | COMMUNITY
Start: 2023-06-21

## 2023-06-21 RX ORDER — SEMAGLUTIDE 0.5 MG/.5ML
0.5 INJECTION, SOLUTION SUBCUTANEOUS
Qty: 2 ML | Refills: 0 | Status: CANCELLED | OUTPATIENT
Start: 2023-06-21

## 2023-06-21 RX ORDER — SEMAGLUTIDE 0.25 MG/.5ML
0.25 INJECTION, SOLUTION SUBCUTANEOUS
Qty: 2 ML | Refills: 0 | COMMUNITY
Start: 2023-06-25 | End: 2023-07-23

## 2023-06-21 NOTE — TELEPHONE ENCOUNTER
Pt stated that there is a backorder on the [facility] crest and will like to know if he a sample rx is available

## 2023-06-22 ENCOUNTER — TELEPHONE (OUTPATIENT)
Age: 33
End: 2023-06-22

## 2023-06-22 NOTE — TELEPHONE ENCOUNTER
Cira Garcia may stop bye tomorrow to  a sample of the [facility] MOUNIKA. If he does, the sample is in the sample box (refrigerator) with his name on it. He was made was aware yesterday and today I lvm on his voicemail that he can  his sample tomorrow morning between the hours of 9-12:30        Thank you.

## 2023-08-01 ENCOUNTER — TELEPHONE (OUTPATIENT)
Age: 33
End: 2023-08-01

## 2023-08-01 NOTE — TELEPHONE ENCOUNTER
Pt stated that he is not able to get the [facility]FORT MOUNIKA and would like either have another sample or either an alternative rx. Pt will be paying out of pocket for this rx.

## 2023-08-04 RX ORDER — METFORMIN HYDROCHLORIDE 500 MG/1
TABLET, EXTENDED RELEASE ORAL
Qty: 360 TABLET | Refills: 1 | Status: SHIPPED | OUTPATIENT
Start: 2023-08-04

## 2023-08-04 NOTE — TELEPHONE ENCOUNTER
Pt called and lvm asking whether or not that is an alternative rx that he can take even if it's in a pill form. Pt stated that is currently on the lowest dose of Metformin and will like to know if that can be increased.

## 2023-08-07 RX ORDER — METFORMIN HYDROCHLORIDE 500 MG/1
1000 TABLET, EXTENDED RELEASE ORAL
Qty: 360 TABLET | Refills: 2 | Status: SHIPPED | OUTPATIENT
Start: 2023-08-07

## 2023-08-07 NOTE — TELEPHONE ENCOUNTER
Pt was made aware of the increase dose on Metformin and he requested to have rx send to [facility].

## 2023-08-14 ENCOUNTER — HOSPITAL ENCOUNTER (OUTPATIENT)
Facility: HOSPITAL | Age: 33
Discharge: HOME | End: 2023-08-17
Attending: INTERNAL MEDICINE
Payer: COMMERCIAL

## 2023-08-14 DIAGNOSIS — J84.9 INTERSTITIAL LUNG DISORDERS (HCC): ICD-10-CM

## 2023-08-14 PROCEDURE — 71250 CT THORAX DX C-: CPT

## 2023-11-27 ENCOUNTER — HOSPITAL ENCOUNTER (OUTPATIENT)
Facility: HOSPITAL | Age: 33
Discharge: HOME | End: 2023-11-30
Attending: INTERNAL MEDICINE
Payer: COMMERCIAL

## 2023-11-27 DIAGNOSIS — J32.9 CHRONIC SINUSITIS, UNSPECIFIED LOCATION: ICD-10-CM

## 2023-11-27 PROCEDURE — 70486 CT MAXILLOFACIAL W/O DYE: CPT

## 2024-07-02 RX ORDER — LEVOTHYROXINE SODIUM 75 UG/1
75 TABLET ORAL
Qty: 90 TABLET | Refills: 3 | OUTPATIENT
Start: 2024-07-02

## 2024-08-01 RX ORDER — LEVOTHYROXINE SODIUM 0.07 MG/1
75 TABLET ORAL
Qty: 90 TABLET | Refills: 0 | Status: SHIPPED | OUTPATIENT
Start: 2024-08-01

## 2024-12-30 RX ORDER — LEVOTHYROXINE SODIUM 75 UG/1
75 TABLET ORAL
Qty: 90 TABLET | Refills: 0 | Status: SHIPPED | OUTPATIENT
Start: 2024-12-30

## 2025-01-06 ENCOUNTER — TELEMEDICINE (OUTPATIENT)
Age: 35
End: 2025-01-06
Payer: COMMERCIAL

## 2025-01-06 DIAGNOSIS — F31.5 BIPOLAR DISORDER, CURRENT EPISODE DEPRESSED, SEVERE, WITH PSYCHOTIC FEATURES (HCC): ICD-10-CM

## 2025-01-06 DIAGNOSIS — E03.2 HYPOTHYROIDISM DUE TO MEDICAMENTS AND OTHER EXOGENOUS SUBSTANCES: Primary | ICD-10-CM

## 2025-01-06 PROCEDURE — G2211 COMPLEX E/M VISIT ADD ON: HCPCS | Performed by: INTERNAL MEDICINE

## 2025-01-06 PROCEDURE — 99214 OFFICE O/P EST MOD 30 MIN: CPT | Performed by: INTERNAL MEDICINE

## 2025-01-06 RX ORDER — TIRZEPATIDE 5 MG/.5ML
5 INJECTION, SOLUTION SUBCUTANEOUS
Qty: 2 ML | Refills: 0 | Status: SHIPPED | OUTPATIENT
Start: 2025-01-06

## 2025-01-06 RX ORDER — TEMAZEPAM 15 MG/1
15 CAPSULE ORAL NIGHTLY PRN
COMMUNITY
Start: 2024-12-30

## 2025-01-06 NOTE — PROGRESS NOTES
Chief Complaint   Patient presents with    Thyroid Problem        History of Present Illness: Real Armstrong is a  34 y.o. male with a past medical history significant for bipolar disorder seen in follow-up  for discussion related to thyroid dysfunction in the setting of lithium therapy.      04/02/2021:   Has been taking lithium since Jan. Was looking up sx of hypothyroidism, has been sluggish, feeling cold all the time. Is having a hard time losing weight- is 89 was 175 when graduated high school. Aunt and cousin with hypothyroidism. Does not take iodine  or biotin. Takes magnesium at night.      06/04/2021: Is feeling more energy, noticed that his skin is less dry.  Has not been missing doses of levothyroxine.  Does not have any concerns  related to taking the levothyroxine.      06/03/2022:Notes more acne on the upper back that is bothersome, has tried benzoyl peroxide topically which has lead to dryness. Has not established care with a PCP in the area, previously had  a stable relationship with one in [facility] but this ended when he moved. Otherwise, has a mild sore throat today. Continues with levothyroxine. Seroquel was added to regimen.     06/05/2023: Has gained 30 lbs since taking seroquel- thinks about eating every 2 hours- lost 15lbs with metformin 1000mg daily. Does have CAT, hx of tonsil removal with subsequent bleed. Doing well with lithium from bipolar d/o perspective. No hx of pancreatitis or heavy alcohol use.    01/06/2025: Using semaglutide through compound pharmacy- cannot tolerate metformin. Starting weight 228lbs, 187lbs, minimum weight was 176. Is at goal weight now. Off serouel, wanted to try to wean off semaglutide by summer or fall. Has been consistent with exercise, going 5 days/week.         Current Outpatient Medications:     temazepam (RESTORIL) 15 MG capsule, Take 1 capsule by mouth nightly as needed., Disp: , Rfl:     levothyroxine (SYNTHROID) 75 MCG tablet, TAKE ONE TABLET BY

## 2025-06-22 RX ORDER — LEVOTHYROXINE SODIUM 75 UG/1
75 TABLET ORAL
Qty: 90 TABLET | Refills: 3 | Status: SHIPPED | OUTPATIENT
Start: 2025-06-22